# Patient Record
Sex: FEMALE | Race: WHITE | NOT HISPANIC OR LATINO | Employment: UNEMPLOYED
[De-identification: names, ages, dates, MRNs, and addresses within clinical notes are randomized per-mention and may not be internally consistent; named-entity substitution may affect disease eponyms.]

---

## 2017-07-22 ENCOUNTER — HEALTH MAINTENANCE LETTER (OUTPATIENT)
Age: 32
End: 2017-07-22

## 2019-09-17 ENCOUNTER — OFFICE VISIT (OUTPATIENT)
Dept: OBGYN | Facility: CLINIC | Age: 34
End: 2019-09-17
Payer: MEDICAID

## 2019-09-17 ENCOUNTER — ANCILLARY PROCEDURE (OUTPATIENT)
Dept: ULTRASOUND IMAGING | Facility: CLINIC | Age: 34
End: 2019-09-17
Attending: OBSTETRICS & GYNECOLOGY
Payer: MEDICAID

## 2019-09-17 ENCOUNTER — TELEPHONE (OUTPATIENT)
Dept: OBGYN | Facility: CLINIC | Age: 34
End: 2019-09-17

## 2019-09-17 VITALS — SYSTOLIC BLOOD PRESSURE: 136 MMHG | DIASTOLIC BLOOD PRESSURE: 88 MMHG

## 2019-09-17 DIAGNOSIS — O20.0 THREATENED MISCARRIAGE: ICD-10-CM

## 2019-09-17 DIAGNOSIS — O20.0 THREATENED MISCARRIAGE: Primary | ICD-10-CM

## 2019-09-17 DIAGNOSIS — O20.0 MISCARRIAGE, THREATENED, EARLY PREGNANCY: Primary | ICD-10-CM

## 2019-09-17 LAB — B-HCG SERPL-ACNC: <1 IU/L (ref 0–5)

## 2019-09-17 PROCEDURE — 99202 OFFICE O/P NEW SF 15 MIN: CPT | Performed by: OBSTETRICS & GYNECOLOGY

## 2019-09-17 PROCEDURE — 76817 TRANSVAGINAL US OBSTETRIC: CPT | Performed by: OBSTETRICS & GYNECOLOGY

## 2019-09-17 PROCEDURE — 84702 CHORIONIC GONADOTROPIN TEST: CPT | Performed by: OBSTETRICS & GYNECOLOGY

## 2019-09-17 PROCEDURE — 36415 COLL VENOUS BLD VENIPUNCTURE: CPT | Performed by: OBSTETRICS & GYNECOLOGY

## 2019-09-17 PROCEDURE — 76801 OB US < 14 WKS SINGLE FETUS: CPT | Performed by: OBSTETRICS & GYNECOLOGY

## 2019-09-17 RX ORDER — OMEPRAZOLE 20 MG/1
20 TABLET, DELAYED RELEASE ORAL DAILY
COMMUNITY

## 2019-09-17 RX ORDER — PRENATAL VIT/IRON FUM/FOLIC AC 27MG-0.8MG
1 TABLET ORAL DAILY
COMMUNITY
End: 2021-06-14

## 2019-09-17 RX ORDER — IBUPROFEN 200 MG
200 TABLET ORAL EVERY 4 HOURS PRN
COMMUNITY
End: 2021-04-09

## 2019-09-17 NOTE — PROGRESS NOTES
S: here for ultrasound follow up as an add on after calling with early pregnancy spotting. She has not been seen in our [facility] for quite some time, but was a patient here years ago.    Reports regular cycles, was tracking periods and states this was a planned pregnancy. Took 2 urine pregnancy tests at home that were positive. Light spotting started late yesterday, no heavy bleeding, no pain.    Past Medical History:   Diagnosis Date     Bipolar 2 disorder (H)      Mixed hyperlipidemia      Past Surgical History:   Procedure Laterality Date     C NONSPECIFIC PROCEDURE  3/21/01    Primary Low Transverse  Section. abstracted 02.     HC LAPAROSCOPY, SURGICAL; CHOLECYSTECTOMY       HC REPAIR HERNIA WITH MESH       Social History     Socioeconomic History     Marital status: Single     Spouse name: Not on file     Number of children: Not on file     Years of education: Not on file     Highest education level: Not on file   Occupational History     Not on file   Social Needs     Financial resource strain: Not on file     Food insecurity:     Worry: Not on file     Inability: Not on file     Transportation needs:     Medical: Not on file     Non-medical: Not on file   Tobacco Use     Smoking status: Current Every Day Smoker     Packs/day: 1.00     Years: 5.00     Pack years: 5.00     Smokeless tobacco: Never Used     Tobacco comment: more when working   Substance and Sexual Activity     Alcohol use: Yes     Comment: Social     Drug use: No     Sexual activity: Yes     Partners: Male     Birth control/protection: Condom, Pill   Lifestyle     Physical activity:     Days per week: Not on file     Minutes per session: Not on file     Stress: Not on file   Relationships     Social connections:     Talks on phone: Not on file     Gets together: Not on file     Attends [employer] service: Not on file     Active member of club or organization: Not on file     Attends meetings of clubs or organizations: Not on file      Relationship status: Not on file     Intimate partner violence:     Fear of current or ex partner: Not on file     Emotionally abused: Not on file     Physically abused: Not on file     Forced sexual activity: Not on file   Other Topics Concern     Parent/sibling w/ CABG, MI or angioplasty before 65F 55M? Not Asked   Social History Narrative     Not on file         O: /88 (BP Location: Right arm, Cuff Size: Adult Regular)   LMP 07/07/2019 (Exact Date)   General: appears well, no distress.  US shows no intrauterine pregnancy or any evidence of ectopic. Endometrial stripe is thin.    A/P: spotting, with either early pregnancy or possible chemical pregnancy now resolved, less likely ectopic.    -Will get quantitative HCG today, if positive, repeat on Thursday.  -Blood type confirmed O positive from prior records in Epic.  -discussed possibility of pregnancy at less than 5 weeks, ectopic (precautions given), early loss. Follow up plan after labs are done.    She states her understanding.    Trina Fried MD

## 2019-09-17 NOTE — TELEPHONE ENCOUNTER
Pt calling.   LMP 7/7/19. Normal period. approx 10+ weeks pregnant.    Pt started having bright red spotting yesterday, which has continued on today.     Some cramping noted. Minimal.     Pt is O positive.     Pt does have her NPN appts scheduled.     Pt will come in for an  today. Orders placed.

## 2019-09-17 NOTE — NURSING NOTE
"Chief Complaint   Patient presents with     Prenatal Care     LMP 2019, 2 positive home HCG tests a month ago in August. Bleeding started yesterday.       Initial /88 (BP Location: Right arm, Cuff Size: Adult Regular)   LMP 2019 (Exact Date)  Estimated body mass index is 42.51 kg/m  as calculated from the following:    Height as of 13: 1.6 m (5' 3\").    Weight as of 13: 108.9 kg (240 lb).  BP completed using cuff size: regular    Questioned patient about current smoking habits.  Pt. currently smokes.  Advised about smoking cessation.          Narayan Virk, OLI               "

## 2019-11-04 ENCOUNTER — HEALTH MAINTENANCE LETTER (OUTPATIENT)
Age: 34
End: 2019-11-04

## 2020-11-16 ENCOUNTER — HEALTH MAINTENANCE LETTER (OUTPATIENT)
Age: 35
End: 2020-11-16

## 2020-12-08 ENCOUNTER — HOSPITAL ENCOUNTER (EMERGENCY)
Facility: CLINIC | Age: 35
Discharge: HOME | End: 2020-12-08
Attending: EMERGENCY MEDICINE | Admitting: EMERGENCY MEDICINE
Payer: COMMERCIAL

## 2020-12-08 VITALS
RESPIRATION RATE: 16 BRPM | DIASTOLIC BLOOD PRESSURE: 111 MMHG | SYSTOLIC BLOOD PRESSURE: 149 MMHG | HEART RATE: 75 BPM | TEMPERATURE: 97.3 F | OXYGEN SATURATION: 100 %

## 2020-12-08 DIAGNOSIS — Z20.822 EXPOSURE TO COVID-19 VIRUS: ICD-10-CM

## 2020-12-08 LAB
SARS-COV-2 RNA SPEC QL NAA+PROBE: NORMAL
SPECIMEN SOURCE: NORMAL

## 2020-12-08 PROCEDURE — U0003 INFECTIOUS AGENT DETECTION BY NUCLEIC ACID (DNA OR RNA); SEVERE ACUTE RESPIRATORY SYNDROME CORONAVIRUS 2 (SARS-COV-2) (CORONAVIRUS DISEASE [COVID-19]), AMPLIFIED PROBE TECHNIQUE, MAKING USE OF HIGH THROUGHPUT TECHNOLOGIES AS DESCRIBED BY CMS-2020-01-R: HCPCS | Performed by: EMERGENCY MEDICINE

## 2020-12-08 PROCEDURE — 99283 EMERGENCY DEPT VISIT LOW MDM: CPT

## 2020-12-08 PROCEDURE — C9803 HOPD COVID-19 SPEC COLLECT: HCPCS

## 2020-12-08 ASSESSMENT — ENCOUNTER SYMPTOMS
MYALGIAS: 0
FEVER: 0
FATIGUE: 0
DIARRHEA: 0
SHORTNESS OF BREATH: 0

## 2020-12-08 NOTE — ED PROVIDER NOTES
History     Chief Complaint:  Covid Testing     HPI  Disha Watson is a 35 year old female presents for evaluation of Covid testing.  Patient is a mother of current ED patient being admitted for behavioral health who tested positive for COVID-19 .  Patient otherwise denies any symptoms including fever, body aches, fatigue, chest pain, shortness of breath, loss of taste/smell, diarrhea.  Given close contact with daughter, patient would like to be tested.      Allergies:  No Known Drug Allergies     Medications:    Prilosec    Medical History:   Bipolar 2 disorder   Hyperlipidemia      Surgical History   Cholecystectomy   Hernia with mesh repair    section     Family History:   Mother: Psychotic disorder     Social History:  Patient was not accompanied to the ED.   Smoking Status:  Smoker    Type: Cigarettes   Packs/Day: 1.00  Smokeless Tobacco: Never Used   Alcohol Use: Positive   Drug Use: Negative    [place]       Review of Systems   Constitutional: Negative for fatigue and fever.   Respiratory: Negative for shortness of breath.    Cardiovascular: Negative for chest pain.   Gastrointestinal: Negative for diarrhea.   Musculoskeletal: Negative for myalgias.   Neurological:        No loss of taste/smell   All other systems reviewed and are negative.         Physical Exam     Patient Vitals for the past 24 hrs:   BP Temp Temp src Pulse Resp SpO2   20 1614 (!) 149/111 97.3  F (36.3  C) Oral 75 16 100 %          Physical Exam  General: Alert, no acute distress; speaking in full sentences  HEENT:  Moist mucous membranes.   Conjunctiva normal.  No cervical lymphadenopathy  CV:  RRR, no m/r/g, skin warm and well perfused  Pulm:  CTAB, no wheezes/ronchi/rales.  No acute distress, breathing comfortably  MSK:  Moving all extremities.  No focal areas of edema, erythema, or tenderness  Skin:  WWP, no rashes, no lower extremity edema, skin color normal, no diaphoresis    Emergency Department Course      Laboratory:    Asymptomatic COVID-19 Virus (Coronavirus), PCR NP Swab: pending          Emergency Department Course:  1605 Nursing notes and vitals reviewed.   I performed an exam of the patient as documented above.     1609 Findings and plan explained to the Patient. Patient discharged home with instructions regarding supportive care, medications, and reasons to return. The importance of close follow-up was reviewed.     I personally reviewed the lab orderswith the Patient and answered all related questions prior to discharge.    Impression & Plan     Medical Decision Making:  Disha Watson is a 35 year old female who presents to the ER for Covid testing.  Patient denies any symptoms but daughter who is being admitted to  tested positive for Covid 12/7.  Vital signs are stable and exam is unremarkable.  COVID swab sent and pending.  Patient is otherwise safe to discharge home with results pending.  Reasons to return to the ER discussed.  All questions answered prior to discharge.    Covid-19  Disha Watson was evaluated during a global COVID-19 pandemic, which necessitated consideration that the patient might be at risk for infection with the SARS-CoV-2 virus that causes COVID-19.   Applicable protocols for evaluation were followed during the patient's care.   COVID-19 was considered as part of the patient's evaluation. The plan for testing is:  a test was obtained during this visit.       Diagnosis:     ICD-10-CM    1. Exposure to COVID-19 virus  Z20.828 Asymptomatic COVID-19 Virus (Coronavirus) by PCR        Disposition:  Discharged to home.    Scribe Disclosure:  I, Ron Grossman, am serving as a scribe at 4:21 PM on 12/8/2020 to document services personally performed by Dimitry Kohli MD based on my observations and the provider's statements to me.     This note was completed in part using Dragon voice recognition software. Although reviewed after completion, some word and grammatical errors may  occur.     [facility]  December 8, 2020      [place], Dimitry Lopez MD  12/08/20 1585

## 2020-12-08 NOTE — ED TRIAGE NOTES
Pt here as visitor of daughter who is waiting for inpatient bed.  Daughter tested positive for COVID.  Pt is requesting testing.

## 2020-12-08 NOTE — ED AVS SNAPSHOT
[facility]  [address]  [facility] [address]  Phone: [phone]  Fax: [phone]                                    Disha Watson   MRN: [id number]    Department: [facility]   Date of Visit: 12/8/2020           After Visit Summary Signature Page    I have received my discharge instructions, and my questions have been answered. I have discussed any challenges I see with this plan with the nurse or doctor.    ..........................................................................................................................................  Patient/Patient Representative Signature      ..........................................................................................................................................  Patient Representative Print Name and Relationship to Patient    ..................................................               ................................................  Date                                   Time    ..........................................................................................................................................  Reviewed by Signature/Title    ...................................................              ..............................................  Date                                               Time          22EPIC Rev 08/18

## 2020-12-09 LAB
LABORATORY COMMENT REPORT: NORMAL
SARS-COV-2 RNA SPEC QL NAA+PROBE: NEGATIVE
SPECIMEN SOURCE: NORMAL

## 2021-04-08 VITALS
TEMPERATURE: 97.9 F | OXYGEN SATURATION: 98 % | SYSTOLIC BLOOD PRESSURE: 163 MMHG | HEART RATE: 88 BPM | RESPIRATION RATE: 20 BRPM | DIASTOLIC BLOOD PRESSURE: 100 MMHG

## 2021-04-08 PROCEDURE — 99284 EMERGENCY DEPT VISIT MOD MDM: CPT | Mod: 25

## 2021-04-08 PROCEDURE — 27786 TREATMENT OF ANKLE FRACTURE: CPT | Mod: LT

## 2021-04-09 ENCOUNTER — HOSPITAL ENCOUNTER (EMERGENCY)
Facility: CLINIC | Age: 36
Discharge: HOME | End: 2021-04-09
Attending: EMERGENCY MEDICINE | Admitting: EMERGENCY MEDICINE
Payer: COMMERCIAL

## 2021-04-09 ENCOUNTER — APPOINTMENT (OUTPATIENT)
Dept: GENERAL RADIOLOGY | Facility: CLINIC | Age: 36
End: 2021-04-09
Attending: EMERGENCY MEDICINE
Payer: COMMERCIAL

## 2021-04-09 DIAGNOSIS — S82.832A CLOSED FRACTURE OF DISTAL END OF LEFT FIBULA, UNSPECIFIED FRACTURE MORPHOLOGY, INITIAL ENCOUNTER: ICD-10-CM

## 2021-04-09 PROCEDURE — 73610 X-RAY EXAM OF ANKLE: CPT | Mod: LT

## 2021-04-09 RX ORDER — HYDROCODONE BITARTRATE AND ACETAMINOPHEN 5; 325 MG/1; MG/1
1 TABLET ORAL EVERY 4 HOURS PRN
Qty: 12 TABLET | Refills: 0 | Status: SHIPPED | OUTPATIENT
Start: 2021-04-09 | End: 2021-04-12

## 2021-04-09 ASSESSMENT — ENCOUNTER SYMPTOMS: ARTHRALGIAS: 1

## 2021-04-09 NOTE — ED PROVIDER NOTES
History     Chief Complaint:  Left ankle Pain     HPI   Disha Watson is a 35 year old female with history of fibromyalgia and metabolic syndrome who presents withankle pain. The patient states that she was walking out of her house in her slippers this evening when she slipped and landed hard on her left leg on the corner of the stair. She says she heard several cracking noises in her leg with the incident, and her pain goes up to her mid shin. She notes she did have a few drinks with vodka this evening but stopped drinking once the incident occurred.     Review of Systems   Musculoskeletal: Positive for arthralgias (left ankle).   All other systems reviewed and are negative.    Allergies:  Morphine [Fumaric Acid]  Prednisone  Duloxetine  Pregabalin  Tramadol    Medications:  The patient does not take any regular medications.    Past Medical History:    Bipolar 2 disorder  Hyperlipidemia  Fibromyalgia  Iron deficiency  Gastroesophageal reflux disease  Panic attacks  Migraine  Metabolic syndrome    Past Surgical History:    Bariatric surgery, sleeve gastroplasty  Laparoscopic cholecystectomy   section  Repair hernia with mesh  Cholecystectomy  Cleft lip repair     Family History:    Psychotic disorder  Coronary artery disease  Alcohol/Drug  Hypertension  Hyperlipidemia  Pulmonary fibrosis    Social History:  Accompanied by daughter.  Works at [employer].    Physical Exam     Patient Vitals for the past 24 hrs:   BP Temp Temp src Pulse Resp SpO2   21 2357 163/100 97.9  F (36.6  C) Temporal 88 20 98 %     Physical Exam  Nursing note and vitals reviewed.  Constitutional: Cooperative.   Cardiovascular: Normal rate, regular rhythm. 2+ left DP pulse  Pulmonary/Chest: Effort normal.   Abdominal: Normal appearance. There is no tenderness.   Musculoskeletal: LLE:  Swelling and tenderness to the lateral malleolus. No tenderness to the proximal fibula. Full ROM of hip and knee.  Neurological:  Alert. GCS 15. Strength and sensation in LLE normal.   Skin: Skin is warm and dry.  Psychiatric: Normal mood and affect.     Emergency Department Course     Imaging:  XR Ankle Left G/E 3 Views  Mildly displaced oblique fracture through the distal fibula ending at the level the ankle mortise with overlying soft tissue swelling. Mild widening of the distal tibiofibular syndesmosis.  Reading per radiology    Procedures       Left Leg Splint Placement   Well-padded fiberglass posterior slab splint with stirrup was applied to the left leg and after placement I checked and adjusted the fit to ensure proper positioning. Patient was more comfortable with splint in place. Sensation and circulation are intact after splint placement.    Reviewed:  I reviewed nursing notes, vitals, past medical history and care everywhere    Assessments:  0022 I obtained history and examined the patient as noted above. I also performed the splint procedure as noted above.    Disposition:  The patient was discharged to home.     Impression & Plan     Medical Decision Making:  Disha Watson is a 35 year old female who presents with a mildly displaced fibula fracture, confirmed on x-ray.  There is no evidence as above of related neurovascular compromise nor of compartment syndrome. There is no proximal fibular tenderness or knee pain to suggest maisonneuve fracture. As above, a splint was placed, with good pain relief.  The patient was provided crutches and oral pain medications, with the plan to return for increasing pain, swelling, numbness, or any other concerning symptoms. Finally, I recommended she follow up with orthopedics within 3-5 days.    Diagnosis:    ICD-10-CM    1. Closed fracture of distal end of left fibula  S82.832A        Discharge Medications:  New Prescriptions    HYDROCODONE-ACETAMINOPHEN (NORCO) 5-325 MG TABLET    Take 1 tablet by mouth every 4 hours as needed for pain       Scribe Disclosure:  Vee PEREZ am  serving as a scribe at 12:21 AM on 4/9/2021 to document services personally performed by Dimitry Bautista MD based on my observations and the provider's statements to me.        Dimitry Bautista MD  04/09/21 0127

## 2021-04-09 NOTE — LETTER
Disha Watson was seen and treated in our emergency department, Pt needs to be excused from work through the weekend.      DEANDRA Robertson RN

## 2021-06-14 ENCOUNTER — NURSE TRIAGE (OUTPATIENT)
Dept: NURSING | Facility: CLINIC | Age: 36
End: 2021-06-14

## 2021-06-14 ENCOUNTER — HOSPITAL ENCOUNTER (INPATIENT)
Facility: CLINIC | Age: 36
LOS: 1 days | Discharge: HOME | End: 2021-06-16
Attending: EMERGENCY MEDICINE | Admitting: HOSPITALIST
Payer: COMMERCIAL

## 2021-06-14 DIAGNOSIS — R11.0 NAUSEA: ICD-10-CM

## 2021-06-14 DIAGNOSIS — K59.00 CONSTIPATION, UNSPECIFIED CONSTIPATION TYPE: ICD-10-CM

## 2021-06-14 DIAGNOSIS — N10 PYELONEPHRITIS, ACUTE: ICD-10-CM

## 2021-06-14 DIAGNOSIS — I10 BENIGN ESSENTIAL HYPERTENSION: Primary | ICD-10-CM

## 2021-06-14 DIAGNOSIS — A41.9 SEPSIS WITHOUT ACUTE ORGAN DYSFUNCTION, DUE TO UNSPECIFIED ORGANISM (H): ICD-10-CM

## 2021-06-14 LAB
ALBUMIN SERPL-MCNC: 2.9 G/DL (ref 3.4–5)
ALBUMIN UR-MCNC: 50 MG/DL
ALP SERPL-CCNC: 138 U/L (ref 40–150)
ALT SERPL W P-5'-P-CCNC: 56 U/L (ref 0–50)
ANION GAP SERPL CALCULATED.3IONS-SCNC: 4 MMOL/L (ref 3–14)
APPEARANCE UR: ABNORMAL
AST SERPL W P-5'-P-CCNC: 78 U/L (ref 0–45)
BACTERIA #/AREA URNS HPF: ABNORMAL /HPF
BASOPHILS # BLD AUTO: 0 10E9/L (ref 0–0.2)
BASOPHILS NFR BLD AUTO: 0.7 %
BILIRUB DIRECT SERPL-MCNC: 0.7 MG/DL (ref 0–0.2)
BILIRUB SERPL-MCNC: 1.2 MG/DL (ref 0.2–1.3)
BILIRUB UR QL STRIP: NEGATIVE
BUN SERPL-MCNC: 5 MG/DL (ref 7–30)
CALCIUM SERPL-MCNC: 8.3 MG/DL (ref 8.5–10.1)
CHLORIDE SERPL-SCNC: 102 MMOL/L (ref 94–109)
CO2 SERPL-SCNC: 29 MMOL/L (ref 20–32)
COLOR UR AUTO: ABNORMAL
CREAT SERPL-MCNC: 0.58 MG/DL (ref 0.52–1.04)
DIFFERENTIAL METHOD BLD: ABNORMAL
EOSINOPHIL # BLD AUTO: 0 10E9/L (ref 0–0.7)
EOSINOPHIL NFR BLD AUTO: 0 %
ERYTHROCYTE [DISTWIDTH] IN BLOOD BY AUTOMATED COUNT: 15.4 % (ref 10–15)
GFR SERPL CREATININE-BSD FRML MDRD: >90 ML/MIN/{1.73_M2}
GLUCOSE SERPL-MCNC: 135 MG/DL (ref 70–99)
GLUCOSE UR STRIP-MCNC: NEGATIVE MG/DL
HCG SERPL QL: NEGATIVE
HCT VFR BLD AUTO: 37.6 % (ref 35–47)
HGB BLD-MCNC: 12.8 G/DL (ref 11.7–15.7)
HGB UR QL STRIP: ABNORMAL
IMM GRANULOCYTES # BLD: 0.1 10E9/L (ref 0–0.4)
IMM GRANULOCYTES NFR BLD: 1.1 %
KETONES UR STRIP-MCNC: NEGATIVE MG/DL
LABORATORY COMMENT REPORT: NORMAL
LACTATE BLD-SCNC: 1.5 MMOL/L (ref 0.7–2)
LEUKOCYTE ESTERASE UR QL STRIP: ABNORMAL
LYMPHOCYTES # BLD AUTO: 0.5 10E9/L (ref 0.8–5.3)
LYMPHOCYTES NFR BLD AUTO: 8.5 %
MCH RBC QN AUTO: 34.8 PG (ref 26.5–33)
MCHC RBC AUTO-ENTMCNC: 34 G/DL (ref 31.5–36.5)
MCV RBC AUTO: 102 FL (ref 78–100)
MONOCYTES # BLD AUTO: 0.1 10E9/L (ref 0–1.3)
MONOCYTES NFR BLD AUTO: 2.6 %
MUCOUS THREADS #/AREA URNS LPF: PRESENT /LPF
NEUTROPHILS # BLD AUTO: 4.7 10E9/L (ref 1.6–8.3)
NEUTROPHILS NFR BLD AUTO: 87.1 %
NITRATE UR QL: NEGATIVE
NRBC # BLD AUTO: 0 10*3/UL
NRBC BLD AUTO-RTO: 0 /100
PH UR STRIP: 6 PH (ref 5–7)
PLATELET # BLD AUTO: 126 10E9/L (ref 150–450)
POTASSIUM SERPL-SCNC: 3 MMOL/L (ref 3.4–5.3)
PROT SERPL-MCNC: 7.5 G/DL (ref 6.8–8.8)
RBC # BLD AUTO: 3.68 10E12/L (ref 3.8–5.2)
RBC #/AREA URNS AUTO: 18 /HPF (ref 0–2)
SARS-COV-2 RNA RESP QL NAA+PROBE: NEGATIVE
SODIUM SERPL-SCNC: 135 MMOL/L (ref 133–144)
SOURCE: ABNORMAL
SP GR UR STRIP: 1.02 (ref 1–1.03)
SPECIMEN SOURCE: NORMAL
SQUAMOUS #/AREA URNS AUTO: 3 /HPF (ref 0–1)
UROBILINOGEN UR STRIP-MCNC: 4 MG/DL (ref 0–2)
WBC # BLD AUTO: 5.4 10E9/L (ref 4–11)
WBC #/AREA URNS AUTO: >182 /HPF (ref 0–5)

## 2021-06-14 PROCEDURE — 250N000013 HC RX MED GY IP 250 OP 250 PS 637: Performed by: EMERGENCY MEDICINE

## 2021-06-14 PROCEDURE — 99220 PR INITIAL OBSERVATION CARE,LEVEL III: CPT | Performed by: HOSPITALIST

## 2021-06-14 PROCEDURE — 80076 HEPATIC FUNCTION PANEL: CPT | Performed by: EMERGENCY MEDICINE

## 2021-06-14 PROCEDURE — 81001 URINALYSIS AUTO W/SCOPE: CPT | Performed by: EMERGENCY MEDICINE

## 2021-06-14 PROCEDURE — 250N000011 HC RX IP 250 OP 636: Performed by: EMERGENCY MEDICINE

## 2021-06-14 PROCEDURE — 87086 URINE CULTURE/COLONY COUNT: CPT | Performed by: EMERGENCY MEDICINE

## 2021-06-14 PROCEDURE — 87186 SC STD MICRODIL/AGAR DIL: CPT | Performed by: EMERGENCY MEDICINE

## 2021-06-14 PROCEDURE — 36415 COLL VENOUS BLD VENIPUNCTURE: CPT | Performed by: EMERGENCY MEDICINE

## 2021-06-14 PROCEDURE — 250N000011 HC RX IP 250 OP 636: Performed by: HOSPITALIST

## 2021-06-14 PROCEDURE — 258N000003 HC RX IP 258 OP 636: Performed by: EMERGENCY MEDICINE

## 2021-06-14 PROCEDURE — 250N000013 HC RX MED GY IP 250 OP 250 PS 637: Performed by: PHYSICIAN ASSISTANT

## 2021-06-14 PROCEDURE — 96376 TX/PRO/DX INJ SAME DRUG ADON: CPT

## 2021-06-14 PROCEDURE — 96361 HYDRATE IV INFUSION ADD-ON: CPT

## 2021-06-14 PROCEDURE — 83605 ASSAY OF LACTIC ACID: CPT | Performed by: EMERGENCY MEDICINE

## 2021-06-14 PROCEDURE — 80048 BASIC METABOLIC PNL TOTAL CA: CPT | Performed by: EMERGENCY MEDICINE

## 2021-06-14 PROCEDURE — 87077 CULTURE AEROBIC IDENTIFY: CPT | Performed by: EMERGENCY MEDICINE

## 2021-06-14 PROCEDURE — 250N000013 HC RX MED GY IP 250 OP 250 PS 637: Performed by: HOSPITALIST

## 2021-06-14 PROCEDURE — 96375 TX/PRO/DX INJ NEW DRUG ADDON: CPT

## 2021-06-14 PROCEDURE — 87800 DETECT AGNT MULT DNA DIREC: CPT | Performed by: EMERGENCY MEDICINE

## 2021-06-14 PROCEDURE — G0378 HOSPITAL OBSERVATION PER HR: HCPCS

## 2021-06-14 PROCEDURE — 99285 EMERGENCY DEPT VISIT HI MDM: CPT | Mod: 25

## 2021-06-14 PROCEDURE — C9803 HOPD COVID-19 SPEC COLLECT: HCPCS

## 2021-06-14 PROCEDURE — 84703 CHORIONIC GONADOTROPIN ASSAY: CPT | Performed by: EMERGENCY MEDICINE

## 2021-06-14 PROCEDURE — 96365 THER/PROPH/DIAG IV INF INIT: CPT

## 2021-06-14 PROCEDURE — 87088 URINE BACTERIA CULTURE: CPT | Performed by: EMERGENCY MEDICINE

## 2021-06-14 PROCEDURE — 85025 COMPLETE CBC W/AUTO DIFF WBC: CPT | Performed by: EMERGENCY MEDICINE

## 2021-06-14 PROCEDURE — 87040 BLOOD CULTURE FOR BACTERIA: CPT | Performed by: EMERGENCY MEDICINE

## 2021-06-14 PROCEDURE — 87635 SARS-COV-2 COVID-19 AMP PRB: CPT | Performed by: EMERGENCY MEDICINE

## 2021-06-14 RX ORDER — OXYCODONE HYDROCHLORIDE 5 MG/1
5 TABLET ORAL EVERY 4 HOURS PRN
Status: DISCONTINUED | OUTPATIENT
Start: 2021-06-14 | End: 2021-06-14

## 2021-06-14 RX ORDER — HYDROMORPHONE HYDROCHLORIDE 1 MG/ML
0.5 INJECTION, SOLUTION INTRAMUSCULAR; INTRAVENOUS; SUBCUTANEOUS
Status: DISCONTINUED | OUTPATIENT
Start: 2021-06-14 | End: 2021-06-14

## 2021-06-14 RX ORDER — ACETAMINOPHEN 325 MG/1
975 TABLET ORAL ONCE
Status: COMPLETED | OUTPATIENT
Start: 2021-06-14 | End: 2021-06-14

## 2021-06-14 RX ORDER — OXYCODONE HYDROCHLORIDE 5 MG/1
5-10 TABLET ORAL EVERY 4 HOURS PRN
Status: DISCONTINUED | OUTPATIENT
Start: 2021-06-14 | End: 2021-06-15

## 2021-06-14 RX ORDER — IBUPROFEN 200 MG
800 TABLET ORAL EVERY 8 HOURS PRN
COMMUNITY

## 2021-06-14 RX ORDER — ACETAMINOPHEN 650 MG/1
650 SUPPOSITORY RECTAL EVERY 4 HOURS PRN
Status: DISCONTINUED | OUTPATIENT
Start: 2021-06-14 | End: 2021-06-16 | Stop reason: HOSPADM

## 2021-06-14 RX ORDER — NALOXONE HYDROCHLORIDE 0.4 MG/ML
0.4 INJECTION, SOLUTION INTRAMUSCULAR; INTRAVENOUS; SUBCUTANEOUS
Status: DISCONTINUED | OUTPATIENT
Start: 2021-06-14 | End: 2021-06-16 | Stop reason: HOSPADM

## 2021-06-14 RX ORDER — HYDROMORPHONE HYDROCHLORIDE 1 MG/ML
0.3 INJECTION, SOLUTION INTRAMUSCULAR; INTRAVENOUS; SUBCUTANEOUS
Status: DISCONTINUED | OUTPATIENT
Start: 2021-06-14 | End: 2021-06-16 | Stop reason: HOSPADM

## 2021-06-14 RX ORDER — NALOXONE HYDROCHLORIDE 0.4 MG/ML
0.2 INJECTION, SOLUTION INTRAMUSCULAR; INTRAVENOUS; SUBCUTANEOUS
Status: DISCONTINUED | OUTPATIENT
Start: 2021-06-14 | End: 2021-06-16 | Stop reason: HOSPADM

## 2021-06-14 RX ORDER — IBUPROFEN 600 MG/1
600 TABLET, FILM COATED ORAL EVERY 6 HOURS PRN
Status: DISCONTINUED | OUTPATIENT
Start: 2021-06-14 | End: 2021-06-16 | Stop reason: HOSPADM

## 2021-06-14 RX ORDER — SODIUM CHLORIDE AND POTASSIUM CHLORIDE 150; 900 MG/100ML; MG/100ML
INJECTION, SOLUTION INTRAVENOUS CONTINUOUS
Status: DISCONTINUED | OUTPATIENT
Start: 2021-06-14 | End: 2021-06-15

## 2021-06-14 RX ORDER — CEFTRIAXONE 2 G/1
2 INJECTION, POWDER, FOR SOLUTION INTRAMUSCULAR; INTRAVENOUS ONCE
Status: COMPLETED | OUTPATIENT
Start: 2021-06-14 | End: 2021-06-14

## 2021-06-14 RX ORDER — CEFTRIAXONE 1 G/1
1 INJECTION, POWDER, FOR SOLUTION INTRAMUSCULAR; INTRAVENOUS EVERY 24 HOURS
Status: DISCONTINUED | OUTPATIENT
Start: 2021-06-15 | End: 2021-06-15

## 2021-06-14 RX ORDER — ONDANSETRON 4 MG/1
4 TABLET, ORALLY DISINTEGRATING ORAL EVERY 6 HOURS PRN
Status: DISCONTINUED | OUTPATIENT
Start: 2021-06-14 | End: 2021-06-16 | Stop reason: HOSPADM

## 2021-06-14 RX ORDER — ONDANSETRON 2 MG/ML
4 INJECTION INTRAMUSCULAR; INTRAVENOUS EVERY 30 MIN PRN
Status: DISCONTINUED | OUTPATIENT
Start: 2021-06-14 | End: 2021-06-14

## 2021-06-14 RX ORDER — ONDANSETRON 2 MG/ML
4 INJECTION INTRAMUSCULAR; INTRAVENOUS EVERY 6 HOURS PRN
Status: DISCONTINUED | OUTPATIENT
Start: 2021-06-14 | End: 2021-06-16 | Stop reason: HOSPADM

## 2021-06-14 RX ORDER — POTASSIUM CHLORIDE 1500 MG/1
40 TABLET, EXTENDED RELEASE ORAL ONCE
Status: COMPLETED | OUTPATIENT
Start: 2021-06-14 | End: 2021-06-14

## 2021-06-14 RX ORDER — ACETAMINOPHEN 325 MG/1
650 TABLET ORAL EVERY 4 HOURS PRN
Status: DISCONTINUED | OUTPATIENT
Start: 2021-06-14 | End: 2021-06-16 | Stop reason: HOSPADM

## 2021-06-14 RX ADMIN — HYDROMORPHONE HYDROCHLORIDE 0.5 MG: 1 INJECTION, SOLUTION INTRAMUSCULAR; INTRAVENOUS; SUBCUTANEOUS at 08:57

## 2021-06-14 RX ADMIN — POTASSIUM CHLORIDE AND SODIUM CHLORIDE: 900; 150 INJECTION, SOLUTION INTRAVENOUS at 22:25

## 2021-06-14 RX ADMIN — ACETAMINOPHEN 975 MG: 325 TABLET, FILM COATED ORAL at 07:46

## 2021-06-14 RX ADMIN — OXYCODONE HYDROCHLORIDE 10 MG: 5 TABLET ORAL at 16:47

## 2021-06-14 RX ADMIN — CEFTRIAXONE 2 G: 2 INJECTION, POWDER, FOR SOLUTION INTRAMUSCULAR; INTRAVENOUS at 09:01

## 2021-06-14 RX ADMIN — OXYCODONE HYDROCHLORIDE 5 MG: 5 TABLET ORAL at 12:37

## 2021-06-14 RX ADMIN — ACETAMINOPHEN 650 MG: 325 TABLET, FILM COATED ORAL at 15:27

## 2021-06-14 RX ADMIN — ACETAMINOPHEN 650 MG: 325 TABLET, FILM COATED ORAL at 20:39

## 2021-06-14 RX ADMIN — POTASSIUM CHLORIDE 40 MEQ: 1500 TABLET, EXTENDED RELEASE ORAL at 09:12

## 2021-06-14 RX ADMIN — OMEPRAZOLE 20 MG: 20 CAPSULE, DELAYED RELEASE ORAL at 12:02

## 2021-06-14 RX ADMIN — HYDROMORPHONE HYDROCHLORIDE 0.3 MG: 1 INJECTION, SOLUTION INTRAMUSCULAR; INTRAVENOUS; SUBCUTANEOUS at 13:59

## 2021-06-14 RX ADMIN — ONDANSETRON HYDROCHLORIDE 4 MG: 2 INJECTION, SOLUTION INTRAMUSCULAR; INTRAVENOUS at 21:44

## 2021-06-14 RX ADMIN — SODIUM CHLORIDE, POTASSIUM CHLORIDE, SODIUM LACTATE AND CALCIUM CHLORIDE 1000 ML: 600; 310; 30; 20 INJECTION, SOLUTION INTRAVENOUS at 09:40

## 2021-06-14 RX ADMIN — ONDANSETRON 4 MG: 2 INJECTION INTRAMUSCULAR; INTRAVENOUS at 08:56

## 2021-06-14 RX ADMIN — OXYCODONE HYDROCHLORIDE 10 MG: 5 TABLET ORAL at 20:39

## 2021-06-14 RX ADMIN — POTASSIUM CHLORIDE AND SODIUM CHLORIDE: 900; 150 INJECTION, SOLUTION INTRAVENOUS at 12:36

## 2021-06-14 ASSESSMENT — ENCOUNTER SYMPTOMS
DIARRHEA: 0
FEVER: 1
CHILLS: 1
SHORTNESS OF BREATH: 0
CONSTIPATION: 1
VOMITING: 1
ABDOMINAL PAIN: 1
FLANK PAIN: 1

## 2021-06-14 ASSESSMENT — MIFFLIN-ST. JEOR
SCORE: 1729.13
SCORE: 1750.46

## 2021-06-14 NOTE — TELEPHONE ENCOUNTER
Disha had a bladder infection a couple of weeks ago.  Today has cloudy urine, fowl smelling and low back pain and headache and nauseated.  Temp is currently 103.7.  Disha states that she fainted over the weekend.  Disha states that she will go to ED.  Patient denies burning with urination    COVID 19 Nurse Triage Plan/Patient Instructions    Please be aware that novel coronavirus (COVID-19) may be circulating in the community. If you develop symptoms such as fever, cough, or SOB or if you have concerns about the presence of another infection including coronavirus (COVID-19), please contact your health care provider or visit [email or web address].     Disposition/Instructions    In-Person Visit with provider recommended. Reference Visit Selection Guide.    Thank you for taking steps to prevent the spread of this virus.  o Limit your contact with others.  o Wear a simple mask to cover your cough.  o Wash your hands well and often.    Resources    [facility]: About COVID-19: [email or web address]    CDC: What to Do If You're Sick: www.cdc.gov/coronavirus/2019-ncov/about/steps-when-sick.html    CDC: Ending Home Isolation: www.cdc.gov/coronavirus/2019-ncov/hcp/disposition-in-home-patients.html     CDC: Caring for Someone: www.cdc.gov/coronavirus/2019-ncov/if-you-are-sick/care-for-someone.html     [facility]: Interim Guidance for Hospital Discharge to Home: www.health.[facility].mn.us/diseases/coronavirus/hcp/hospdischarge.pdf    [facility] clinical trials (COVID-19 research studies): clinicalaffairs.[facility]-clinical-trials     Below are the COVID-19 hotlines at the [facility] ([facility]). Interpreters are available.   o For health questions: Call 874-758-3715 or 1-143.462.3668 (7 a.m. to 7 p.m.)  o For questions about schools and childcare: Call 170-656-7264 or 1-768.389.8920 (7 a.m. to 7 p.m.)                       Reason for Disposition    Fever > 100.4 F (38.0  C)    Additional Information    Negative: Shock suspected (e.g., cold/pale/clammy skin, too weak to stand, low BP, rapid pulse)    Negative: Sounds like a life-threatening emergency to the triager    Negative: [1] Unable to urinate (or only a few drops) > 4 hours AND     [2] bladder feels very full (e.g., palpable bladder or strong urge to urinate)    Negative: [1] Decreased urination and [2] drinking very little AND [2] dehydration suspected (e.g., dark urine, no urine > 12 hours, very dry mouth, very lightheaded)    Negative: Patient sounds very sick or weak to the triager    Protocols used: URINARY SYMPTOMS-A-AH

## 2021-06-14 NOTE — PROGRESS NOTES
ROOM # 226    Living Situation (if not independent, order SW consult): IND  Facility name:  : [facility]) [phone]    Activity level at baseline: IND  Activity level on admit: SBA      Patient registered to observation; given Patient Bill of Rights; given the opportunity to ask questions about observation status and their plan of care.  Patient has been oriented to the observation room, bathroom and call light is in place.    Discussed discharge goals and expectations with patient/family.

## 2021-06-14 NOTE — PLAN OF CARE
PRIMARY DIAGNOSIS: PYELONEPHRITIS  OUTPATIENT/OBSERVATION GOALS TO BE MET BEFORE DISCHARGE:  Diagnostic test and consults (if applicable) complete: Yes    Vitals signs stable or return to baseline: No Bps high, fevers    Tolerate oral intake to maintain hydration: Yes    Pain status: Improved-controlled with oral pain medications.    Tolerating oral antibiotics or has plans for home infusion setup:  Yes    Return to near baseline physical activity: Yes    Discharge Planner Nurse   Safe discharge environment identified: Yes  Barriers to discharge: No       Entered by: Carmela Osullivan 06/14/2021 1:03 PM     Please review provider order for any additional goals.   Nurse to notify provider when observation goals have been met and patient is ready for discharge.    Pt is alert and oriented. Up SBA States pain is much improved after Dilaudid in the ED. Pt states that she has urgency and burning with urination. Pt had hematuria on arrival to the unit, she states this was the first occurrence.

## 2021-06-14 NOTE — LETTER
[facility] OBSERVATION DEPT  [address]  [facility] [address]  [phone] June 16, 2021    RE:  Disha Watson                                                                                                                                                       [id number]  [facility] [address]        To whom it may concern:    Disha Watson was admitted to the hospital under my care from 6/14/2021 to 6/16/2021. She will discharge home and will need to continue to recover there. She will need to remain out from work until Monday, June 21, 2021.      Sincerely,            Sukumar Shirley MD

## 2021-06-14 NOTE — H&P
[facility]    History and Physical - Hospitalist Service       Date of Admission:  6/14/2021    Assessment & Plan   Disha Watson is a 36 year old female with fibromyalgia admitted on 6/14/2021 with UTI and pyelonephritis    UTI with pyelonephritis    - UA in ED appears dirty    - clinically has a pyelonephritis (fever, flank pain)    - the only previous culture we have was many years ago: E Coli    - already received ceftriaxone in ED, will continue    - pending cultures     - IVF, pain control    Hypokalemia    - replaced in ED    - IVF with NS with KCL    Full Code  Will call and update her boyfriend Joshua [phone] (called, VM message stating VM not set up yet()     Diet: Regular Diet Adult    DVT Prophylaxis: Low Risk/Ambulatory with no VTE prophylaxis indicated  Patton Catheter: not present  Code Status: Full Code           Disposition Plan   Expected discharge: Tomorrow, recommended to prior living arrangement once antibiotic plan established.  Entered: Sukumar Shirley MD 06/14/2021, 11:46 AM     The patient's care was discussed with the Bedside Nurse, Patient, Patient's Family and ED Provider.    Sukumar Shirley MD  [facility]  Contact information available via [facility] Paging/Directory      ______________________________________________________________________    Chief Complaint   Flank pain, UTI    History is obtained from the patient    History of Present Illness   Disha Watson is a 36 year old female with fibromyalgia admitted on 6/14/2021 with UTI and pyelonephritis. Patient states her symptoms started 3 weeks ago. She took Azo and felt like her symptoms improved.  She states on Saturday she started having malodorous and discolored urine again.  She also had body aches including her neck and a migraine.  She then started having bilateral flank pain.  She was also reporting fevers up to 103.  She states she has had urinary tract infections in the  past.  she denies any chest pain, shortness of breath, abdominal pain she did vomit once on Saturday.  No diarrhea.  No URI symptoms.  Her pain has improved after pain medications in the emergency room.  It is now 3 out of 10.      Review of Systems    The 10 point Review of Systems is negative other than noted in the HPI or here.     Past Medical History    I have reviewed this patient's medical history and updated it with pertinent information if needed.   Past Medical History:   Diagnosis Date     Bipolar 2 disorder      Mixed hyperlipidemia        Past Surgical History   I have reviewed this patient's surgical history and updated it with pertinent information if needed.  Past Surgical History:   Procedure Laterality Date     BARIATRIC SURGERY       LAPAROSCOPIC CHOLECYSTECTOMY       Primary Low Transverse  Section  2001     REPAIR HERNIA WITH MESH         Social History   I have reviewed this patient's social history and updated it with pertinent information if needed.  Social History     Tobacco Use     Smoking status: Current Every Day Smoker     Packs/day: 1.00     Years: 5.00     Pack years: 5.00     Smokeless tobacco: Never Used     Tobacco comment: more when working   Substance Use Topics     Alcohol use: Yes     Comment: Social     Drug use: No       Family History   I have reviewed this patient's family history and updated it with pertinent information if needed.  Family History   Problem Relation Age of Onset     Diabetes Maternal Grandmother      Psychotic Disorder Mother      C.A.D. Paternal Aunt         MI,  age 40s     C.A.D. Paternal Grandmother      Lupus Paternal Aunt      Lupus Other         cousin       Prior to Admission Medications   Prior to Admission Medications   Prescriptions Last Dose Informant Patient Reported? Taking?   ibuprofen (ADVIL/MOTRIN) 200 MG tablet 2021 at Unknown time  Yes Yes   Sig: Take 800 mg by mouth every 8 hours as needed for mild pain    omeprazole (PRILOSEC OTC) 20 MG EC tablet 6/13/2021 at Unknown time Self Yes Yes   Sig: Take 20 mg by mouth daily      Facility-Administered Medications: None     Allergies   Allergies   Allergen Reactions     Morphine [Fumaric Acid] Itching and Swelling       Physical Exam   Vital Signs: Temp: 99.4  F (37.4  C) Temp src: Oral BP: (!) 163/86 Pulse: 89   Resp: 20 SpO2: 95 % O2 Device: None (Room air)    Weight: 242 lbs 12.8 oz    Constitutional: awake, alert, cooperative, no apparent distress, and appears stated age  Eyes: Lids and lashes normal, pupils equal, round and reactive to light, extra ocular muscles intact, sclera clear, conjunctiva normal  ENT: Normocephalic, without obvious abnormality, atraumatic, sinuses nontender on palpation, external ears without lesions, oral pharynx with moist mucous membranes, tonsils without erythema or exudates, gums normal and good dentition.  Respiratory: No increased work of breathing, good air exchange, clear to auscultation bilaterally, no crackles or wheezing  Cardiovascular: Normal apical impulse, regular rate and rhythm, normal S1 and S2, no S3 or S4, and no murmur noted  GI: No scars, normal bowel sounds, soft, non-distended, non-tender, no masses palpated, no hepatosplenomegally. No cva tenderness  Skin: no bruising or bleeding  Musculoskeletal: no lower extremity pitting edema present    Data   Data reviewed today: I reviewed all medications, new labs and imaging results over the last 24 hours. I personally reviewed no images or EKG's today.    Most Recent 3 CBC's:  Recent Labs   Lab Test 06/14/21 0752 04/22/13  1650   WBC 5.4 11.2*   HGB 12.8 13.4   * 87   * 310     Most Recent 3 BMP's:  Recent Labs   Lab Test 06/14/21 0752 04/22/13  1650    141   POTASSIUM 3.0* 3.9   CHLORIDE 102 101   CO2 29 25   BUN 5* 10   CR 0.58 0.57   ANIONGAP 4 14.7   NICK 8.3* 9.1   * 91     Most Recent 2 LFT's:  Recent Labs   Lab Test 06/14/21 0752  04/22/13  1650   AST 78* 22   ALT 56* 30   ALKPHOS 138 74   BILITOTAL 1.2 0.5     Most Recent Urinalysis:  Recent Labs   Lab Test 06/14/21  0757   COLOR Dark Yellow   APPEARANCE Slightly Cloudy   URINEGLC Negative   URINEBILI Negative   URINEKETONE Negative   SG 1.020   UBLD Small*   URINEPH 6.0   PROTEIN 50*   NITRITE Negative   LEUKEST Large*   RBCU 18*   WBCU >182*     No results found for this or any previous visit (from the past 24 hour(s)).

## 2021-06-14 NOTE — ED TRIAGE NOTES
ABCs intact. Pt c/o bilateral flank pain. Pt states she had a UTI about 3 weeks ago and took OTC Azo for it. Pt c/o fever, chills, shaking the past few days as well.

## 2021-06-14 NOTE — PLAN OF CARE
PRIMARY DIAGNOSIS: PYELONEPHRITIS  OUTPATIENT/OBSERVATION GOALS TO BE MET BEFORE DISCHARGE:  Diagnostic test and consults (if applicable) complete: Yes     Vitals signs stable or return to baseline: No Bps high, fevers     Tolerate oral intake to maintain hydration: Yes     Pain status: Not controlled with oral pain medications.     Tolerating oral antibiotics or has plans for home infusion setup:  Yes     Return to near baseline physical activity: Yes     Discharge Planner Nurse   Safe discharge environment identified: Yes  Barriers to discharge: No          Please review provider order for any additional goals.   Nurse to notify provider when observation goals have been met and patient is ready for discharge.     Pt is alert and oriented. Up SBA Gave oxycodone for pain 4/10, pt did not feel improvement, so Dilaudid was given also. Pt states that she has urgency and burning with urination. Pt had hematuria on arrival to the unit, she states this was the first occurrence of bleeding. Pt had a temp of 101.8 with chills, Tylenol given. Will continue to monitor.

## 2021-06-14 NOTE — ED NOTES
"[facility]  ED Nurse Handoff Report    Disha Watson is a 36 year old female   ED Chief complaint: Flank Pain  . ED Diagnosis:   Final diagnoses:   Sepsis without acute organ dysfunction, due to unspecified organism (H)   Pyelonephritis, acute     Allergies:   Allergies   Allergen Reactions     Morphine [Fumaric Acid] Itching and Swelling       Code Status: Full Code  Activity level - Baseline/Home:  Independent. Activity Level - Current:   Independent. Lift room needed: No. Bariatric: No   Needed: No   Isolation: No. Infection: Not Applicable.     Vital Signs:   Vitals:    06/14/21 0737 06/14/21 0840 06/14/21 0905   BP: (!) 161/111  (!) 156/89   Pulse: 129  95   Resp: 18  16   Temp: 103.5  F (39.7  C) 100.6  F (38.1  C)    TempSrc: Oral Oral    SpO2: 95%  95%   Weight: 108 kg (238 lb 1.6 oz)     Height: 1.6 m (5' 3\")         Cardiac Rhythm:  ,      Pain level:    Patient confused: No. Patient Falls Risk: No.   Elimination Status: Has voided   Patient Report - Initial Complaint: Bilateral flank pain, fever, chills. Focused Assessment: Gilmer flank pain  Tests Performed: labs, blood cultures. Abnormal Results:   Labs Ordered and Resulted from Time of ED Arrival Up to the Time of Departure from the ED   ROUTINE UA WITH MICROSCOPIC - Abnormal; Notable for the following components:       Result Value    Blood Urine Small (*)     Protein Albumin Urine 50 (*)     Urobilinogen mg/dL 4.0 (*)     Leukocyte Esterase Urine Large (*)     WBC Urine >182 (*)     RBC Urine 18 (*)     Bacteria Urine Many (*)     Squamous Epithelial /HPF Urine 3 (*)     Mucous Urine Present (*)     All other components within normal limits   CBC WITH PLATELETS DIFFERENTIAL - Abnormal; Notable for the following components:    RBC Count 3.68 (*)      (*)     MCH 34.8 (*)     RDW 15.4 (*)     Platelet Count 126 (*)     Absolute Lymphocytes 0.5 (*)     All other components within normal limits   BASIC METABOLIC PANEL - " Abnormal; Notable for the following components:    Potassium 3.0 (*)     Glucose 135 (*)     Urea Nitrogen 5 (*)     Calcium 8.3 (*)     All other components within normal limits   HEPATIC PANEL - Abnormal; Notable for the following components:    Bilirubin Direct 0.7 (*)     Albumin 2.9 (*)     ALT 56 (*)     AST 78 (*)     All other components within normal limits   LACTIC ACID WHOLE BLOOD   SARS-COV-2 (COVID-19) VIRUS RT-PCR   HCG QUALITATIVE   NURSING DRAW AND HOLD   BLOOD CULTURE   URINE CULTURE AEROBIC BACTERIAL   BLOOD CULTURE      Treatments provided: IV antibiotics, pain meds, IV fluids  Family Comments: none  OBS brochure/video discussed/provided to patient:  Yes  ED Medications:   Medications   lactated ringers BOLUS 1,000 mL (1,000 mLs Intravenous New Bag 6/14/21 0940)   ondansetron (ZOFRAN) injection 4 mg (4 mg Intravenous Given 6/14/21 0856)   HYDROmorphone (PF) (DILAUDID) injection 0.5 mg (0.5 mg Intravenous Given 6/14/21 0857)   acetaminophen (TYLENOL) tablet 975 mg (975 mg Oral Given 6/14/21 0746)   cefTRIAXone (ROCEPHIN) 2 g vial to attach to  ml bag for ADULTS or NS 50 ml bag for PEDS (0 g Intravenous Stopped 6/14/21 0931)   potassium chloride ER (KLOR-CON M) CR tablet 40 mEq (40 mEq Oral Given 6/14/21 0912)     Drips infusing:  No  For the majority of the shift, the patient's behavior Green. Interventions performed were none.    Sepsis treatment initiated: NA     Patient tested for COVID 19 prior to admission: YES    ED Nurse Name/Phone Number: Merry Norris RN,   10:27 AM    RECEIVING UNIT ED HANDOFF REVIEW    Above ED Nurse Handoff Report was reviewed: Yes  Reviewed by: Carmela Osullivan RN on June 14, 2021 at 11:11 AM

## 2021-06-14 NOTE — ED PROVIDER NOTES
History   Chief Complaint:  Flank Pain     HPI   Disha Watson is a 36 year old female with history of GERD, Hyperlipidemia and morbid obesity who presents with bilateral flank pain. The patient had a bladder infection three weeks ago. She took OTC Azo for this which relieved her symptoms. Yesterday, the patient began experiancing a fever with chills, increased urination and one episode of emesis. Additionally, she notes lower bilateral abdominal pain that radiates to her back. She denies any diarrhea but states she hasn't had a bowel movement in a few days. She currently rates her pain at 7/10. She denies any dysuria, chest pain or shortness of breath. Of note, she occasionally drinks alcohol and smokes tobacco.    Review of Systems   Constitutional: Positive for chills and fever.   Respiratory: Negative for shortness of breath.    Cardiovascular: Negative for chest pain.   Gastrointestinal: Positive for abdominal pain, constipation and vomiting. Negative for diarrhea.   Genitourinary: Positive for flank pain.   All other systems reviewed and are negative.    Allergies:  Morphine   Prednisone  Venom-honey bee  Duloxetine  Pregabalin  Tramadol    Medications:  Prilosec    Past Medical History:    Bipolar 2 disorder  Hyperlipidemia  Fibromyalgia  Human papillomavirus  Iron deficiency  GERD  Panic attacks  Trochanteric bursitis  Tobacco abuse  Morbid obesity  Anxiety    Past Surgical History:    Bariatric surgery  Laparoscopic cholecystectomy  Primary low transverse C section  Repair hernia with mesh    Family History:    Mother: Psychotic disorder, Alcohol abuse, Anxiety, Hyperlipidemia, Hypertension, Obesity, Sexual abuse  Father: Alcohol abuse, Anxiety, CAD, Depression, Hyperlipidemia, Hypertension, Obesity, Pulmonary fibrosis  Brother: Alcohol abuse, Depression, Drug abuse, Obesity    Social History:  The patient was unaccompanied to the ED.  Occassionally drinks alcohol and smokes tobacco.     Physical Exam  "    Patient Vitals for the past 24 hrs:   BP Temp Temp src Pulse Resp SpO2 Height Weight   06/14/21 0905 (!) 156/89 -- -- 95 16 95 % -- --   06/14/21 0840 -- 100.6  F (38.1  C) Oral -- -- -- -- --   06/14/21 0737 (!) 161/111 103.5  F (39.7  C) Oral 129 18 95 % 1.6 m (5' 3\") 108 kg (238 lb 1.6 oz)       Physical Exam  Constitutional: Well developed, nontox appearance  Head: Atraumatic.   Neck:  no stridor  Eyes: no scleral icterus  Cardiovascular: Reg tachycardia, 2+ bilat radial pulses  Pulmonary/Chest: nml resp effort, Clear BS bilat  Abdominal: ND, soft, BLQ tenderness, no rebound or guarding   : CVA tenderness bilat  Ext: Warm, well perfused, no edema  Neurological: A&O, symmetric facies, moves ext x4  Skin: Skin is warm and dry.   Psychiatric: Behavior is normal. Thought content normal.   Nursing note and vitals reviewed.    Emergency Department Course   Laboratory:  UA with Microscopic: Blood Small (A), Protein Albumin 50 (A), Urobilinogen 4.0 (H), Leukocyte Esterase Large (A), WBC >182 (H), RBC 18 (H), Bacteria Many (A), Squamous Epithelial 3 (H), Mucous Urine Present (A) o/w Negative    CBC: WBC 5.4, HGB 12.8,  (L)  BMP: Potassium 3.0 (L), Glucose 135 (H), BUN 5 (L), Calcium 8.3 (L) o/w WNL (Creatinine 0.58)    Lactic acid (Collected 0752): 1.5    Hepatic Panel: Bilirubin 0.7 (H), Albumin 2.9 (L), ALT 56 (H), AST 78 (H) o/w WNL    Asymptomatic COVID-19 Virus (Coronavirus) by PCR Nasopharyngeal swab: Negative    Blood Cultures x2: Pending    Urine Culture Aerobic Bacterial: Pending    Emergency Department Course:  Reviewed:  I reviewed nursing notes, vitals, past medical history and care everywhere    Assessments:  0829 I obtained history and examined the patient as noted above.     1011 I rechecked and updated the patient regarding the laboratory results and the plan for care.    Consults:   1850 I spoke with the hospitalist, Dr. Shirley, regarding the patient.    Interventions:  0746 Tylenol 975mg " PO  0856 Zofran 4mg IV  0857 Dilaudid 0.5mg IV  0901 Rocephin 2g IV  0912 Potassium chloride 40mEq PO    Disposition:  The patient was admitted to the hospital under the care of Dr. Shirley.     Impression & Plan   Covid-19  Disha Watson was evaluated during a global COVID-19 pandemic, which necessitated consideration that the patient might be at risk for infection with the SARS-CoV-2 virus that causes COVID-19.   Applicable protocols for evaluation were followed during the patient's care.   COVID-19 was considered as part of the patient's evaluation. The plan for testing is:  a test was obtained during this visit.    CMS Diagnoses: none    Medical Decision Makin year old female presenting w/ fever, bilateral flank pain, urinary symptoms     DDx includes UTI, pyelonephritis, bacteremia, sepsis, severe sepsis, electrolyte abnormality, kidney dysfunction.  Less likely ureteral stone given history of cystitis, symptoms consistent with cystitis and treated without antibiotics, nonfocal pain with bilateral flank and abdominal tenderness.  Labs significant for normal white blood cell count, normal lactate level, creatinine within normal limits, mild thrombocytopenia, minimal elevation of LFTs likely insignificant.  Antibiotics given as noted above.  Given no evidence of severe sepsis, septic shock or severe focal pain indicative of a ureteral stone, imaging deferred.  Given sepsis, pyelonephritis and need for further symptom control, pt admitted to the hospitalist service for further evaluation and mgmt.  Pt counseled on all results, disposition and diagnosis.  They are understanding and agreeable to plan. Patient admitted in stable condition.      Diagnosis:    ICD-10-CM    1. Sepsis without acute organ dysfunction, due to unspecified organism (H)  A41.9 Blood culture     Urine Culture     Blood culture   2. Pyelonephritis, acute  N10        Scribe Disclosure:  Darwin PEREZ, am serving as a scribe at 8:19 AM  on 6/14/2021 to document services personally performed by Steven Paredes MD based on my observations and the provider's statements to me.      Steven Paredes MD  06/14/21 6141

## 2021-06-15 ENCOUNTER — APPOINTMENT (OUTPATIENT)
Dept: CT IMAGING | Facility: CLINIC | Age: 36
End: 2021-06-15
Attending: SPECIALIST
Payer: COMMERCIAL

## 2021-06-15 LAB
ANION GAP SERPL CALCULATED.3IONS-SCNC: 5 MMOL/L (ref 3–14)
ANISOCYTOSIS BLD QL SMEAR: SLIGHT
BACTERIA SPEC CULT: ABNORMAL
BASOPHILS # BLD AUTO: 0 10E9/L (ref 0–0.2)
BASOPHILS NFR BLD AUTO: 1 %
BUN SERPL-MCNC: 3 MG/DL (ref 7–30)
CALCIUM SERPL-MCNC: 7.7 MG/DL (ref 8.5–10.1)
CHLORIDE SERPL-SCNC: 102 MMOL/L (ref 94–109)
CO2 SERPL-SCNC: 28 MMOL/L (ref 20–32)
CREAT SERPL-MCNC: 0.55 MG/DL (ref 0.52–1.04)
DIFFERENTIAL METHOD BLD: ABNORMAL
EOSINOPHIL # BLD AUTO: 0 10E9/L (ref 0–0.7)
EOSINOPHIL NFR BLD AUTO: 0 %
ERYTHROCYTE [DISTWIDTH] IN BLOOD BY AUTOMATED COUNT: 15.3 % (ref 10–15)
GFR SERPL CREATININE-BSD FRML MDRD: >90 ML/MIN/{1.73_M2}
GLUCOSE SERPL-MCNC: 97 MG/DL (ref 70–99)
HCT VFR BLD AUTO: 33.2 % (ref 35–47)
HGB BLD-MCNC: 10.8 G/DL (ref 11.7–15.7)
LACTATE BLD-SCNC: 1 MMOL/L (ref 0.7–2)
LYMPHOCYTES # BLD AUTO: 0.4 10E9/L (ref 0.8–5.3)
LYMPHOCYTES NFR BLD AUTO: 10 %
Lab: ABNORMAL
MACROCYTES BLD QL SMEAR: PRESENT
MCH RBC QN AUTO: 33.6 PG (ref 26.5–33)
MCHC RBC AUTO-ENTMCNC: 32.5 G/DL (ref 31.5–36.5)
MCV RBC AUTO: 103 FL (ref 78–100)
METAMYELOCYTES # BLD: 0 10E9/L
METAMYELOCYTES NFR BLD MANUAL: 1 %
MONOCYTES # BLD AUTO: 0.1 10E9/L (ref 0–1.3)
MONOCYTES NFR BLD AUTO: 3 %
NEUTROPHILS # BLD AUTO: 3.1 10E9/L (ref 1.6–8.3)
NEUTROPHILS NFR BLD AUTO: 85 %
PLATELET # BLD AUTO: 103 10E9/L (ref 150–450)
PLATELET # BLD EST: ABNORMAL 10*3/UL
POTASSIUM SERPL-SCNC: 3.5 MMOL/L (ref 3.4–5.3)
RBC # BLD AUTO: 3.21 10E12/L (ref 3.8–5.2)
SODIUM SERPL-SCNC: 135 MMOL/L (ref 133–144)
SPECIMEN SOURCE: ABNORMAL
WBC # BLD AUTO: 3.7 10E9/L (ref 4–11)

## 2021-06-15 PROCEDURE — 250N000013 HC RX MED GY IP 250 OP 250 PS 637: Performed by: HOSPITALIST

## 2021-06-15 PROCEDURE — 36415 COLL VENOUS BLD VENIPUNCTURE: CPT | Performed by: HOSPITALIST

## 2021-06-15 PROCEDURE — 80048 BASIC METABOLIC PNL TOTAL CA: CPT | Performed by: HOSPITALIST

## 2021-06-15 PROCEDURE — 99233 SBSQ HOSP IP/OBS HIGH 50: CPT | Performed by: HOSPITALIST

## 2021-06-15 PROCEDURE — 250N000011 HC RX IP 250 OP 636: Performed by: HOSPITALIST

## 2021-06-15 PROCEDURE — 250N000011 HC RX IP 250 OP 636: Performed by: INTERNAL MEDICINE

## 2021-06-15 PROCEDURE — 74176 CT ABD & PELVIS W/O CONTRAST: CPT

## 2021-06-15 PROCEDURE — G0378 HOSPITAL OBSERVATION PER HR: HCPCS

## 2021-06-15 PROCEDURE — 83605 ASSAY OF LACTIC ACID: CPT | Performed by: HOSPITALIST

## 2021-06-15 PROCEDURE — 96376 TX/PRO/DX INJ SAME DRUG ADON: CPT

## 2021-06-15 PROCEDURE — 96361 HYDRATE IV INFUSION ADD-ON: CPT

## 2021-06-15 PROCEDURE — 250N000013 HC RX MED GY IP 250 OP 250 PS 637: Performed by: PHYSICIAN ASSISTANT

## 2021-06-15 PROCEDURE — 85025 COMPLETE CBC W/AUTO DIFF WBC: CPT | Performed by: HOSPITALIST

## 2021-06-15 PROCEDURE — 120N000004 HC R&B MS OVERFLOW

## 2021-06-15 PROCEDURE — 87040 BLOOD CULTURE FOR BACTERIA: CPT | Performed by: HOSPITALIST

## 2021-06-15 RX ORDER — HYDROXYZINE HYDROCHLORIDE 25 MG/1
25 TABLET, FILM COATED ORAL 3 TIMES DAILY PRN
Status: DISCONTINUED | OUTPATIENT
Start: 2021-06-15 | End: 2021-06-16 | Stop reason: HOSPADM

## 2021-06-15 RX ORDER — CEFTRIAXONE 2 G/1
2 INJECTION, POWDER, FOR SOLUTION INTRAMUSCULAR; INTRAVENOUS EVERY 24 HOURS
Status: DISCONTINUED | OUTPATIENT
Start: 2021-06-15 | End: 2021-06-16 | Stop reason: HOSPADM

## 2021-06-15 RX ORDER — LISINOPRIL 10 MG/1
10 TABLET ORAL DAILY
Status: DISCONTINUED | OUTPATIENT
Start: 2021-06-15 | End: 2021-06-16 | Stop reason: HOSPADM

## 2021-06-15 RX ORDER — OXYCODONE HYDROCHLORIDE 5 MG/1
5-10 TABLET ORAL
Status: DISCONTINUED | OUTPATIENT
Start: 2021-06-15 | End: 2021-06-16 | Stop reason: HOSPADM

## 2021-06-15 RX ORDER — PROCHLORPERAZINE MALEATE 5 MG
5 TABLET ORAL EVERY 6 HOURS PRN
Status: DISCONTINUED | OUTPATIENT
Start: 2021-06-15 | End: 2021-06-16 | Stop reason: HOSPADM

## 2021-06-15 RX ORDER — PROCHLORPERAZINE 25 MG
25 SUPPOSITORY, RECTAL RECTAL EVERY 12 HOURS PRN
Status: DISCONTINUED | OUTPATIENT
Start: 2021-06-15 | End: 2021-06-16 | Stop reason: HOSPADM

## 2021-06-15 RX ADMIN — OXYCODONE HYDROCHLORIDE 10 MG: 5 TABLET ORAL at 16:22

## 2021-06-15 RX ADMIN — OXYCODONE HYDROCHLORIDE 5 MG: 5 TABLET ORAL at 01:20

## 2021-06-15 RX ADMIN — HYDROXYZINE HYDROCHLORIDE 25 MG: 25 TABLET, FILM COATED ORAL at 20:07

## 2021-06-15 RX ADMIN — ONDANSETRON 4 MG: 4 TABLET, ORALLY DISINTEGRATING ORAL at 16:22

## 2021-06-15 RX ADMIN — ONDANSETRON 4 MG: 4 TABLET, ORALLY DISINTEGRATING ORAL at 11:01

## 2021-06-15 RX ADMIN — OMEPRAZOLE 20 MG: 20 CAPSULE, DELAYED RELEASE ORAL at 08:34

## 2021-06-15 RX ADMIN — OXYCODONE HYDROCHLORIDE 10 MG: 5 TABLET ORAL at 05:33

## 2021-06-15 RX ADMIN — ACETAMINOPHEN 650 MG: 325 TABLET, FILM COATED ORAL at 18:14

## 2021-06-15 RX ADMIN — LISINOPRIL 10 MG: 10 TABLET ORAL at 08:43

## 2021-06-15 RX ADMIN — OXYCODONE HYDROCHLORIDE 10 MG: 5 TABLET ORAL at 08:43

## 2021-06-15 RX ADMIN — ACETAMINOPHEN 650 MG: 325 TABLET, FILM COATED ORAL at 05:40

## 2021-06-15 RX ADMIN — CEFTRIAXONE 2 G: 2 INJECTION, POWDER, FOR SOLUTION INTRAMUSCULAR; INTRAVENOUS at 08:34

## 2021-06-15 RX ADMIN — ONDANSETRON HYDROCHLORIDE 4 MG: 2 INJECTION, SOLUTION INTRAMUSCULAR; INTRAVENOUS at 05:33

## 2021-06-15 RX ADMIN — OXYCODONE HYDROCHLORIDE 10 MG: 5 TABLET ORAL at 12:48

## 2021-06-15 RX ADMIN — OXYCODONE HYDROCHLORIDE 10 MG: 5 TABLET ORAL at 21:40

## 2021-06-15 RX ADMIN — ENOXAPARIN SODIUM 40 MG: 40 INJECTION SUBCUTANEOUS at 11:02

## 2021-06-15 ASSESSMENT — MIFFLIN-ST. JEOR: SCORE: 1789.93

## 2021-06-15 NOTE — PLAN OF CARE
"PRIMARY DIAGNOSIS: PYELONEPHRITIS  OUTPATIENT/OBSERVATION GOALS TO BE MET BEFORE DISCHARGE:  1. Diagnostic test and consults (if applicable) complete: Yes     2. Vitals signs stable or return to baseline: No Bps high, fevers     3. Tolerate oral intake to maintain hydration: Yes     4. Pain status: Present, but controlled with oral pain medications.     5. Tolerating oral antibiotics or has plans for home infusion setup:  Yes. Currently receiving IV Rocephin.     6. Return to near baseline physical activity: Yes     Discharge Planner Nurse   Safe discharge environment identified: Yes  Barriers to discharge: No          Please review provider order for any additional goals.   Nurse to notify provider when observation goals have been met and patient is ready for discharge.     BP (!) 155/91 (BP Location: Right arm)   Pulse 101   Temp 101.4  F (38.6  C) (Oral)   Resp 18   Ht 1.6 m (5' 3\")   Wt 114.1 kg (251 lb 8 oz)   LMP 05/23/2021   SpO2 91%   BMI 44.55 kg/m      Pt is alert and oriented x4. Up with SBA. Pain located to back, flank, and groin region. Continued hematuria/dark brown urine noted throughout the night. Reporting urgency and burning with urination. High temps. T-max= 101.4. PRN tylenol administered with some relief. States 10mg oxycodone is sufficient, but wears off quickly. N/V noted x2 last night after pain medication administration. PRN zofran administered with effectiveness. NS+20 KCL infusing at 100 mL/hr.  Will continue to provide supportive cares and pain management. Labs to be redrawn tomorrow morning.    See note regarding critical value - blood cultures preliminary growing E. Coli. Admitting Paer (Hospitalist) paged.  "

## 2021-06-15 NOTE — PLAN OF CARE
DATE:  6/15/2021   TIME OF RECEIPT FROM LAB:  03:54AM  LAB TEST:  Update on positive blood cultures  LAB VALUE: BC from 6/14 at 07:52AM from left arm - preliminary results positive for E. Coli  RESULTS GIVEN WITH READ-BACK TO (PROVIDER):  Admitting pager (hospitalist)  TIME LAB VALUE REPORTED TO PROVIDER:   03:55AM

## 2021-06-15 NOTE — PLAN OF CARE
"PRIMARY DIAGNOSIS: PYELONEPHRITIS  OUTPATIENT/OBSERVATION GOALS TO BE MET BEFORE DISCHARGE:  1. Diagnostic test and consults (if applicable) complete: Yes     2. Vitals signs stable or return to baseline: No Bps high, fevers     3. Tolerate oral intake to maintain hydration: Yes     4. Pain status: Present, but controlled with oral pain medications.     5. Tolerating oral antibiotics or has plans for home infusion setup:  Yes. Currently receiving IV Rocephin.     6. Return to near baseline physical activity: Yes     Discharge Planner Nurse   Safe discharge environment identified: Yes  Barriers to discharge: No          Please review provider order for any additional goals.   Nurse to notify provider when observation goals have been met and patient is ready for discharge.     BP (!) 144/83 (BP Location: Right arm)   Pulse 95   Temp 100.9  F (38.3  C) (Oral)   Resp 16   Ht 1.6 m (5' 3\")   Wt 110.1 kg (242 lb 12.8 oz)   LMP 05/23/2021   SpO2 95%   BMI 43.01 kg/m      Pt is alert and oriented x4. Up with SBA. Pain located to back, flank, and groin region. Continued hematuria/dark brown urine noted throughout the night. Reporting urgency and burning with urination. High temps. T-max= 101.4. PRN tylenol administered with some relief. States 10mg oxycodone is sufficient, but wears off quickly. N/V noted x2 last night after pain medication administration. PRN zofran administered with effectiveness. NS+20 KCL infusing at 100 mL/hr.  Will continue to provide supportive cares and pain management. Labs to be redrawn tomorrow morning.  "

## 2021-06-15 NOTE — UTILIZATION REVIEW
Admission Status; Secondary Review Determination    Under the authority of the Utilization Management Committee, the utilization review process indicated a secondary review on the above patient. The review outcome is based on review of the medical records, discussions with staff, and applying clinical experience noted on the date of the review.    (x) Inpatient Status Appropriate - This patient's medical care is consistent with medical management for inpatient care and reasonable inpatient medical practice.    RATIONALE FOR DETERMINATION: 36 year old female with fibromyalgia, morbid obesity who presents with bilateral flank pain  admitted on 6/14/2021 with pyelonephritis with bacteremia and recurrent high fever appropriate for IP care.    At the time of admission with the information available to the attending physician more than 2 nights Hospital complex care was anticipated, based on patient risk of adverse outcome if treated as outpatient and complex care required. Inpatient admission is appropriate based on the Medicare guidelines.    This document was produced using voice recognition software    The information on this document is developed by the utilization review team in order for the business office to ensure compliance. This only denotes the appropriateness of proper admission status and does not reflect the quality of care rendered.    The definitions of Inpatient Status and Observation Status used in making the determination above are those provided in the CMS Coverage Manual, Chapter 1 and Chapter 6, section 70.4.    Sincerely,    Eriberto Herron MD  Utilization Review  Physician Advisor  [facility].

## 2021-06-15 NOTE — PROGRESS NOTES
[facility]    Medicine Progress Note - Hospitalist Service       Date of Admission:  6/14/2021  Assessment & Plan       Disha Watson is a 36 year old female with fibromyalgia admitted on 6/14/2021 with UTI and pyelonephritis. Now found to be bacteremia     UTI with pyelonephritis and bacteremia    - urine and blood (left arm, 6/14, 8pm) growing E Coli    - on ceftriaxone Day #2, pending sensitivities    - had 2nd blood culture 6/14, 9pm of right arm that is negative so far    - will repeat blood cultures today    - will need longer course of antibiotics due to bacteremia    - will have ID see patient to dictate course of antibiotics    - pain control with oral oxy, tylenol, motrin and IV dilaudid    Hypokalemia    - replaced in ED    - IVF with NS with KCL    - potassium stable today    HTN    - patient does not carry this diagnosis    - has been persistently hypertensive here      - will start lisinopril and adjust as needed    Admit to inpatient for bacteremia, need for continued IV antibiotics    She requested an email be sent to  at her place of employment. This was completed.     Diet: Regular Diet Adult    DVT Prophylaxis: Enoxaparin (Lovenox) SQ  Patton Catheter: not present  Code Status: Full Code           Disposition Plan   Expected discharge: 1-2 days, recommended to prior living arrangement once antibiotic plan established.  Entered: Sukumar Shirley MD 06/15/2021, 8:26 AM       The patient's care was discussed with the Patient, Patient's Family and ID Consultant.    Sukumar Shirley MD  Hospitalist Service  [facility]  Contact information available via [facility] Paging/Directory    ______________________________________________________________________    Interval History   Patient doing better. Pain 3/10. No chest pain, sob. Eating, but not much appetite.     Data reviewed today: I reviewed all medications, new labs and imaging results over the last 24  hours. I personally reviewed no images or EKG's today.    Physical Exam   Vital Signs: Temp: 100.5  F (38.1  C) Temp src: Axillary BP: (!) 167/102 Pulse: 54   Resp: 17 SpO2: 96 % O2 Device: None (Room air)    Weight: 251 lbs 8 oz  Constitutional: awake, alert, cooperative, no apparent distress, and appears stated age  Eyes: Lids and lashes normal, pupils equal, round and reactive to light, extra ocular muscles intact, sclera clear, conjunctiva normal  ENT: Normocephalic, without obvious abnormality, atraumatic, sinuses nontender on palpation, external ears without lesions, oral pharynx with moist mucous membranes, tonsils without erythema or exudates, gums normal and good dentition.  Respiratory: No increased work of breathing, good air exchange, clear to auscultation bilaterally, no crackles or wheezing  Cardiovascular: Normal apical impulse, regular rate and rhythm, normal S1 and S2, no S3 or S4, and no murmur noted  GI: No scars, normal bowel sounds, soft, non-distended, non-tender, no masses palpated, no hepatosplenomegally  Skin: no bruising or bleeding  Musculoskeletal: no lower extremity pitting edema present    Data   Recent Labs   Lab 06/15/21  0552 06/14/21  0752   WBC 3.7* 5.4   HGB 10.8* 12.8   * 102*   * 126*    135   POTASSIUM 3.5 3.0*   CHLORIDE 102 102   CO2 28 29   BUN 3* 5*   CR 0.55 0.58   ANIONGAP 5 4   NICK 7.7* 8.3*   GLC 97 135*   ALBUMIN  --  2.9*   PROTTOTAL  --  7.5   BILITOTAL  --  1.2   ALKPHOS  --  138   ALT  --  56*   AST  --  78*     No results found for this or any previous visit (from the past 24 hour(s)).

## 2021-06-15 NOTE — CONSULTS
[facility]    Infectious Disease Consultation     Date of Admission:  6/14/2021  Date of Consult : 06/15/21    Assessment  1. E.coli sepsis of urinary origin  2. Pyelonephritis. Given complicated clinical picture with sepsis  Prolonged symptoms and bilateral flank and groin pain, will need to obtain CT scan to rule out stones or other complications like nephric/perinephric abscess  3. Leukopenia likely due to infection. Antibiotics may be contributing. Will need close monitoring of WBC   4. Anemia  5. Hypokalemia  6. Transaminitis may be sepsis related    Recommendations  1. CT abdomen stone protocol  2. Continue Ceftriaxone  3. Follow sensitivities on E.coli isolate  4. Follow WBC count in am  5. If CT stable and patient remains afebrile, could consider discharge on oral Ciprofloxacin (if isolate sensitive) for a total of 7-10 days if antibiotics (day 2 today)  6. Check LFTs in am. If persistent elevation check Hepatitis B/C serologies    Yessi Huitron MD    Reason for Consult    I was asked to evaluate this patient for antimicrobial recommendations for gram negative sepsis    Primary Care Physician   Odalis Hall    Chief Complaint   Fever and flank pain.    History is obtained from the patient and medical records    History of Present Illness   Disha Watson is a 36 year old female who is admitted with a 3 weeks history of malodorous urine and dysuria. She developed symptoms of acute cystitis with dysuria burning with urination, frequency 3 weeks ago and took azo with some improvement. 3 days ago she developed acute onset symptoms with body aches and fever of 103 , vomiting and bilateral flank pain and groin pain and was hospitalized on 6/14 with a diagnosed of pyelonephritis. Subsequently blood cxs have been positive for E.coli. She remains febrile, but fever pattern is improving. She has developed slight leukopenia continues to have nausea and flank pain. She is maintained on IV  Ceftriaxone and ID has been asked to assist with antibiotic management    Antimicrobial therapy   Ceftriaxone    Past Medical History   I have reviewed this patient's medical history and updated it with pertinent information if needed.   Past Medical History:   Diagnosis Date     Bipolar 2 disorder      Mixed hyperlipidemia      Past Surgical History   I have reviewed this patient's surgical history and updated it with pertinent information if needed.  Past Surgical History:   Procedure Laterality Date     BARIATRIC SURGERY       LAPAROSCOPIC CHOLECYSTECTOMY       Primary Low Transverse  Section  2001     REPAIR HERNIA WITH MESH       Prior to Admission Medications   Prior to Admission Medications   Prescriptions Last Dose Informant Patient Reported? Taking?   ibuprofen (ADVIL/MOTRIN) 200 MG tablet 2021 at Unknown time  Yes Yes   Sig: Take 800 mg by mouth every 8 hours as needed for mild pain   omeprazole (PRILOSEC OTC) 20 MG EC tablet 2021 at Unknown time Self Yes Yes   Sig: Take 20 mg by mouth daily      Facility-Administered Medications: None     Allergies   Allergies   Allergen Reactions     Morphine [Fumaric Acid] Itching and Swelling     Immunization History   Immunization History   Administered Date(s) Administered     HepB 1995     Historical mumps 1995     Influenza (IIV3) PF 2008, 10/28/2011     Measles 1995     Rubella 1995     TDAP Vaccine (Adacel) 10/28/2011     Social History   I have reviewed this patient's social history and updated it with pertinent information if needed. Disha Watson  reports that she has been smoking. She has a 5.00 pack-year smoking history. She has never used smokeless tobacco. She reports current alcohol use. She reports that she does not use drugs.    Family History   I have reviewed this patient's family history and updated it with pertinent information if needed.   Family History   Problem Relation Age of Onset      Diabetes Maternal Grandmother      Psychotic Disorder Mother      C.A.D. Paternal Aunt         MI,  age 40s     C.A.D. Paternal Grandmother      Lupus Paternal Aunt      Lupus Other         cousin     Review of Systems   The 10 point Review of Systems is negative other than noted in the HPI or here.     Physical Exam   Temp: 98.7  F (37.1  C) Temp src: Oral BP: (!) 148/95 Pulse: 55   Resp: 18 SpO2: 97 % O2 Device: None (Room air)    Vital Signs with Ranges  Temp:  [98.7  F (37.1  C)-101.8  F (38.8  C)] 98.7  F (37.1  C)  Pulse:  [] 55  Resp:  [16-20] 18  BP: (143-174)/() 148/95  SpO2:  [91 %-99 %] 97 %  251 lbs 8 oz  Body mass index is 44.55 kg/m .    GENERAL APPEARANCE:  awake  EYES: Eyes grossly normal to inspection, PERRL and conjunctivae and sclerae normal  NECK: no adenopathy, no asymmetry, masses, or scars and thyroid normal to palpation  RESP: lungs clear to auscultation - no rales, rhonchi or wheezes  CV: regular rates and rhythm, normal S1 S2, no S3 or S4 and no murmur, click or rub  LYMPHATICS: normal ant/post cervical and supraclavicular nodes  ABDOMEN: soft, nontender, bilateral CVA tenderness  MS: extremities normal- no gross deformities noted  SKIN: no suspicious lesions or rashes    Data   All laboratory data reviewed  Recent Labs   Lab 06/15/21  0911 06/15/21  0908 21  0853 21  0757 21  0752   CULT PENDING PENDING No growth after 13 hours >100,000 colonies/mL  Escherichia coli  Susceptibility testing in progress  * Cultured on the 1st day of incubation:  Gram negative rods  *  Critical Value/Significant Value, preliminary result only, called to and read back by  Edilma Sandoval Rn at 0130 6.15.21. amd    Susceptibility testing in progress  (Note)  POSITIVE for E.COLI by Verigene multiplex nucleic acid test. Final  identification and antimicrobial susceptibility testing will be  verified by standard methods. Verigene test will not distinguish  E.coli from  Shigella species including S.dysenteriae, S.flexneri,  S.boydii, and S.sonnei. Specimens containing Shigella species or  E.coli will be reported as Positive for E.coli.    Specimen tested with Verigene multiplex, gram-negative blood culture  nucleic acid test for the following targets: Acinetobacter sp.,  Citrobacter sp., Enterobacter sp., Proteus sp., E. coli, K.  pneumoniae/oxytoca, P. aeruginosa, and the following resistance  markers: CTXM, KPC, NDM, VIM, IMP and OXA.    Critical Value/Significant Value called to and read back by Yanely Muñoz RN at 0353 6.15.21. amd       Recent Labs   Lab Test 06/15/21  0911 06/15/21  0908 06/14/21  0853 06/14/21  0757 06/14/21  0752 04/22/13  1540   CULT PENDING PENDING No growth after 13 hours >100,000 colonies/mL  Escherichia coli  Susceptibility testing in progress  * Cultured on the 1st day of incubation:  Gram negative rods  *  Critical Value/Significant Value, preliminary result only, called to and read back by  Edilma Sandoval Rn at 0130 6.15.21. amd    Susceptibility testing in progress  (Note)  POSITIVE for E.COLI by Verigene multiplex nucleic acid test. Final  identification and antimicrobial susceptibility testing will be  verified by standard methods. Verigene test will not distinguish  E.coli from Shigella species including S.dysenteriae, S.flexneri,  S.boydii, and S.sonnei. Specimens containing Shigella species or  E.coli will be reported as Positive for E.coli.    Specimen tested with Verigene multiplex, gram-negative blood culture  nucleic acid test for the following targets: Acinetobacter sp.,  Citrobacter sp., Enterobacter sp., Proteus sp., E. coli, K.  pneumoniae/oxytoca, P. aeruginosa, and the following resistance  markers: CTXM, KPC, NDM, VIM, IMP and OXA.    Critical Value/Significant Value called to and read back by Yanely Muñoz RN at 0353 6.15.21. amd   10 to 50,000 colonies/mL Mixed gram positive jackelin Multiple species present, probable perineal  contamination. Susceptibility testing  not routinely done

## 2021-06-15 NOTE — PLAN OF CARE
"PRIMARY DIAGNOSIS: PYELONEPHRITIS  GOALS TO BE MET BEFORE DISCHARGE:    1. Diagnostic test and consults (if applicable) complete: Yes     2. Vitals signs stable or return to baseline: No Bps high, fevers     3. Tolerate oral intake to maintain hydration: Yes     4. Pain status: Present, but controlled with oral pain medications.     5. Tolerating oral antibiotics or has plans for home infusion setup:  Yes. Currently receiving IV Rocephin.     6. Return to near baseline physical activity: Yes     Discharge Planner Nurse   Safe discharge environment identified: Yes  Barriers to discharge: No           Pt is alert and oriented x4. Up with SBA. Pain located to back, flank, and groin. Pt still reporting urgency and burning with urination. Highest temp on this shift so far is 99.0. Patient has had a decrease in pain today with 10mg Oxycodone Q3. Mildly nauseous this am, PRN zofran administered with effectiveness. Patient lost IV access today while showering. IVF have been DC'd as patient is drinking plenty of fluids. She needs IV access for antibiotics tomorrow but asked if she could wait until tomorrow for comfort. MD Ok'd.      Update 16:35  ID ordered a CT to rule out Kidney stones since the patient continues to have flank pain. Oxy given for pain and Zofran given for continued nausea.    Update 18:15  Pt states she feels \"crummy\" Temp 101.2. Tylenol and cold cloth given.  "

## 2021-06-15 NOTE — PLAN OF CARE
"PRIMARY DIAGNOSIS: PYELONEPHRITIS  OUTPATIENT/OBSERVATION GOALS TO BE MET BEFORE DISCHARGE:  1. Diagnostic test and consults (if applicable) complete: Yes     2. Vitals signs stable or return to baseline: No Bps high, fevers     3. Tolerate oral intake to maintain hydration: Yes     4. Pain status: Present, but controlled with oral pain medications.     5. Tolerating oral antibiotics or has plans for home infusion setup:  Yes. Currently receiving IV Rocephin.     6. Return to near baseline physical activity: Yes     Discharge Planner Nurse   Safe discharge environment identified: Yes  Barriers to discharge: No          Please review provider order for any additional goals.   Nurse to notify provider when observation goals have been met and patient is ready for discharge.     BP (!) 155/91 (BP Location: Right arm)   Pulse 101   Temp 101.4  F (38.6  C) (Oral)   Resp 18   Ht 1.6 m (5' 3\")   Wt 114.1 kg (251 lb 8 oz)   LMP 05/23/2021   SpO2 91%   BMI 44.55 kg/m      Pt is alert and oriented x4. Up with SBA. Pain located to back, flank, and groin region. Continued hematuria/dark brown urine noted throughout the night. Reporting urgency and burning with urination. High temps. T-max= 101.4. PRN tylenol administered with some relief. States 10mg oxycodone is sufficient, but wears off quickly. N/V noted x2 last night after pain medication administration. PRN zofran administered with effectiveness. NS+20 KCL infusing at 100 mL/hr.  Will continue to provide supportive cares and pain management. Labs to be redrawn tomorrow morning.  "

## 2021-06-15 NOTE — PROGRESS NOTES
[facility]:    Paged for GNR + Blood culture 1/2. Patient is already on ceftriaxone. Increase abx therapy to 2 gm given bacteremia.     Addendum 0406: Cultures E. Coli. On appropriate abx therapy.

## 2021-06-16 VITALS
SYSTOLIC BLOOD PRESSURE: 153 MMHG | DIASTOLIC BLOOD PRESSURE: 101 MMHG | HEART RATE: 78 BPM | TEMPERATURE: 98.5 F | WEIGHT: 251.5 LBS | OXYGEN SATURATION: 91 % | RESPIRATION RATE: 18 BRPM | HEIGHT: 63 IN | BODY MASS INDEX: 44.56 KG/M2

## 2021-06-16 LAB
ALBUMIN SERPL-MCNC: 2.3 G/DL (ref 3.4–5)
ALP SERPL-CCNC: 114 U/L (ref 40–150)
ALT SERPL W P-5'-P-CCNC: 68 U/L (ref 0–50)
AST SERPL W P-5'-P-CCNC: 94 U/L (ref 0–45)
BILIRUB DIRECT SERPL-MCNC: 0.8 MG/DL (ref 0–0.2)
BILIRUB SERPL-MCNC: 1.2 MG/DL (ref 0.2–1.3)
CREAT SERPL-MCNC: 0.49 MG/DL (ref 0.52–1.04)
GFR SERPL CREATININE-BSD FRML MDRD: >90 ML/MIN/{1.73_M2}
PLATELET # BLD AUTO: 96 10E9/L (ref 150–450)
PROT SERPL-MCNC: 6 G/DL (ref 6.8–8.8)

## 2021-06-16 PROCEDURE — 250N000011 HC RX IP 250 OP 636: Performed by: HOSPITALIST

## 2021-06-16 PROCEDURE — 82565 ASSAY OF CREATININE: CPT | Performed by: HOSPITALIST

## 2021-06-16 PROCEDURE — 250N000013 HC RX MED GY IP 250 OP 250 PS 637: Performed by: HOSPITALIST

## 2021-06-16 PROCEDURE — 99239 HOSP IP/OBS DSCHRG MGMT >30: CPT | Performed by: HOSPITALIST

## 2021-06-16 PROCEDURE — 85049 AUTOMATED PLATELET COUNT: CPT | Performed by: HOSPITALIST

## 2021-06-16 PROCEDURE — 80076 HEPATIC FUNCTION PANEL: CPT | Performed by: HOSPITALIST

## 2021-06-16 PROCEDURE — 36415 COLL VENOUS BLD VENIPUNCTURE: CPT | Performed by: HOSPITALIST

## 2021-06-16 RX ORDER — CIPROFLOXACIN 500 MG/1
500 TABLET, FILM COATED ORAL 2 TIMES DAILY
Qty: 14 TABLET | Refills: 0 | Status: SHIPPED | OUTPATIENT
Start: 2021-06-16 | End: 2021-06-23

## 2021-06-16 RX ORDER — CIPROFLOXACIN 250 MG/1
250 TABLET, FILM COATED ORAL ONCE
Status: COMPLETED | OUTPATIENT
Start: 2021-06-16 | End: 2021-06-16

## 2021-06-16 RX ORDER — ACETAMINOPHEN 325 MG/1
650 TABLET ORAL EVERY 4 HOURS PRN
COMMUNITY
Start: 2021-06-16

## 2021-06-16 RX ORDER — LISINOPRIL 10 MG/1
10 TABLET ORAL DAILY
Qty: 60 TABLET | Refills: 1 | Status: SHIPPED | OUTPATIENT
Start: 2021-06-16

## 2021-06-16 RX ORDER — ONDANSETRON 4 MG/1
4 TABLET, ORALLY DISINTEGRATING ORAL EVERY 6 HOURS PRN
Qty: 10 TABLET | Refills: 0 | Status: SHIPPED | OUTPATIENT
Start: 2021-06-16

## 2021-06-16 RX ORDER — AMOXICILLIN 250 MG
1 CAPSULE ORAL ONCE
Status: DISCONTINUED | OUTPATIENT
Start: 2021-06-16 | End: 2021-06-16 | Stop reason: HOSPADM

## 2021-06-16 RX ORDER — POLYETHYLENE GLYCOL 3350 17 G/17G
17 POWDER, FOR SOLUTION ORAL ONCE
Status: DISCONTINUED | OUTPATIENT
Start: 2021-06-16 | End: 2021-06-16 | Stop reason: HOSPADM

## 2021-06-16 RX ORDER — SENNOSIDES 8.6 MG
1-2 CAPSULE ORAL 2 TIMES DAILY PRN
Qty: 30 CAPSULE | Refills: 0 | Status: SHIPPED | OUTPATIENT
Start: 2021-06-16

## 2021-06-16 RX ORDER — OXYCODONE HYDROCHLORIDE 5 MG/1
5-10 TABLET ORAL
Qty: 10 TABLET | Refills: 0 | Status: SHIPPED | OUTPATIENT
Start: 2021-06-16

## 2021-06-16 RX ADMIN — OXYCODONE HYDROCHLORIDE 10 MG: 5 TABLET ORAL at 04:18

## 2021-06-16 RX ADMIN — CIPROFLOXACIN 250 MG: 250 TABLET ORAL at 08:43

## 2021-06-16 RX ADMIN — OMEPRAZOLE 20 MG: 20 CAPSULE, DELAYED RELEASE ORAL at 07:01

## 2021-06-16 RX ADMIN — LISINOPRIL 10 MG: 10 TABLET ORAL at 08:43

## 2021-06-16 RX ADMIN — ACETAMINOPHEN 650 MG: 325 TABLET, FILM COATED ORAL at 06:05

## 2021-06-16 RX ADMIN — ONDANSETRON 4 MG: 4 TABLET, ORALLY DISINTEGRATING ORAL at 04:19

## 2021-06-16 ASSESSMENT — ACTIVITIES OF DAILY LIVING (ADL)
DRESSING/BATHING_DIFFICULTY: NO
CONCENTRATING,_REMEMBERING_OR_MAKING_DECISIONS_DIFFICULTY: NO
FALL_HISTORY_WITHIN_LAST_SIX_MONTHS: NO
DIFFICULTY_COMMUNICATING: NO
TOILETING_ISSUES: NO
DIFFICULTY_EATING/SWALLOWING: NO
WALKING_OR_CLIMBING_STAIRS_DIFFICULTY: NO
WEAR_GLASSES_OR_BLIND: NO
DOING_ERRANDS_INDEPENDENTLY_DIFFICULTY: NO

## 2021-06-16 NOTE — PLAN OF CARE
Patient's After Visit Summary was reviewed with patient. Patient verbalized understanding of After Visit Summary, recommended follow up and was given an opportunity to ask questions.   Discharge medications sent home with patient/family: YES   Discharged with self.     No IV access. All personal belongings returned. Escorted to exit via wheelchair with staff.    INPATIENT OUT TIME: 10:20 AM

## 2021-06-16 NOTE — DISCHARGE SUMMARY
[facility]  Hospitalist Discharge Summary      Date of Admission:  6/14/2021  Date of Discharge:  6/16/2021  Discharging Provider: Sukumar Shirley MD      Discharge Diagnoses   UTI, pyelonephritis with bacteremia and sepsis. Pancytopenia likely due to infection/sepsis    Follow-ups Needed After Discharge   Follow-up Appointments     Follow-up and recommended labs and tests       Follow up with primary care provider, Odalis Hall, within 7 days   for hospital follow- up.  The following labs/tests are recommended:   follow-up on blood pressure and chem 7 to check potassium levels after   starting new blood pressure medication.             Unresulted Labs Ordered in the Past 30 Days of this Admission     Date and Time Order Name Status Description    6/15/2021 0826 Blood culture Preliminary     6/15/2021 0826 Blood culture Preliminary     6/14/2021 0834 Blood culture Preliminary     6/14/2021 0820 Blood culture Preliminary       These results will be followed up by NA    Discharge Disposition   Discharged to home  Condition at discharge: Stable    Hospital Course   Disha Watson is a 36 year old female with fibromyalgia admitted on 6/14/2021 with UTI and pyelonephritis. Patient states her symptoms started 3 weeks ago. She took Azo and felt like her symptoms improved.  She states on Saturday she started having malodorous and discolored urine again.  She also had body aches including her neck and a migraine.  She then started having bilateral flank pain.  She was also reporting fevers up to 103.  She states she has had urinary tract infections in the past.  she denies any chest pain, shortness of breath, abdominal pain she did vomit once on Saturday.  No diarrhea.  No URI symptoms.  Her pain has improved after pain medications in the emergency room.  It is now 3 out of 10.  Patient was admitted to the hospital.  She was started on IV ceftriaxone.  Her blood cultures grew E. coli.  Her  urine cultures have returned and are growing E. coli that is pansensitive.  Patient has slowly improved clinically.  She was seen by infectious disease who recommended a total of a 10-day course of antibiotics.  She did end up having a CAT scan which confirmed the diagnosis of a pyelonephritis.  She will discharge home today.  She will discharged with 7 more days of ciprofloxacin, oxycodone for the pain, Zofran for the nausea, and bowel meds.  She will follow up with her primary care provider.    Consultations This Hospital Stay   INFECTIOUS DISEASES IP CONSULT    Code Status   Full Code    Time Spent on this Encounter   I, Sukumar Shirley MD, personally saw the patient today and spent greater than 30 minutes discharging this patient.       Sukumar Shirley MD  [facility] OBSERVATION DEPT  [address] [place] [address]  Phone: [phone]  ______________________________________________________________________    Physical Exam   Vital Signs: Temp: 99.7  F (37.6  C) Temp src: Oral BP: 130/83 Pulse: 78   Resp: 18 SpO2: 97 % O2 Device: None (Room air)    Weight: 251 lbs 8 oz  Constitutional: awake, alert, cooperative, no apparent distress, and appears stated age  Eyes: Lids and lashes normal, pupils equal, round and reactive to light, extra ocular muscles intact, sclera clear, conjunctiva normal  ENT: Normocephalic, without obvious abnormality, atraumatic, sinuses nontender on palpation, external ears without lesions, oral pharynx with moist mucous membranes, tonsils without erythema or exudates, gums normal and good dentition.  Respiratory: No increased work of breathing, good air exchange, clear to auscultation bilaterally, no crackles or wheezing  Cardiovascular: Normal apical impulse, regular rate and rhythm, normal S1 and S2, no S3 or S4, and no murmur noted  GI: No scars, normal bowel sounds, soft, non-distended, non-tender, no masses palpated, no hepatosplenomegally  Skin: no  bruising or bleeding  Musculoskeletal: no lower extremity pitting edema present       Primary Care Physician   Odalis Hall    Discharge Orders      Reason for your hospital stay    Acute UTI with pyelonephritis and bacteremia. Undiagnosed hypertension     Follow-up and recommended labs and tests     Follow up with primary care provider, Odalis Hall, within 7 days for hospital follow- up.  The following labs/tests are recommended: follow-up on blood pressure and chem 7 to check potassium levels after starting new blood pressure medication.     Activity    Your activity upon discharge: activity as tolerated     Diet    Follow this diet upon discharge: Orders Placed This Encounter      Regular Diet Adult       Significant Results and Procedures   Most Recent 3 CBC's:  Recent Labs   Lab Test 06/16/21  0523 06/15/21  0552 06/14/21 0752 04/22/13  1650   WBC  --  3.7* 5.4 11.2*   HGB  --  10.8* 12.8 13.4   MCV  --  103* 102* 87   PLT 96* 103* 126* 310     Most Recent 3 BMP's:  Recent Labs   Lab Test 06/16/21  0523 06/15/21  0552 06/14/21 0752 04/22/13  1650   NA  --  135 135 141   POTASSIUM  --  3.5 3.0* 3.9   CHLORIDE  --  102 102 101   CO2  --  28 29 25   BUN  --  3* 5* 10   CR 0.49* 0.55 0.58 0.57   ANIONGAP  --  5 4 14.7   NICK  --  7.7* 8.3* 9.1   GLC  --  97 135* 91     Most Recent 2 LFT's:  Recent Labs   Lab Test 06/16/21 0523 06/14/21 0752   AST 94* 78*   ALT 68* 56*   ALKPHOS 114 138   BILITOTAL 1.2 1.2     Most Recent Urinalysis:  Recent Labs   Lab Test 06/14/21 0757   COLOR Dark Yellow   APPEARANCE Slightly Cloudy   URINEGLC Negative   URINEBILI Negative   URINEKETONE Negative   SG 1.020   UBLD Small*   URINEPH 6.0   PROTEIN 50*   NITRITE Negative   LEUKEST Large*   RBCU 18*   WBCU >182*   ,   Results for orders placed or performed during the hospital encounter of 06/14/21   CT Abdomen Pelvis w/o Contrast    Narrative    CT ABDOMEN AND PELVIS WITHOUT CONTRAST 6/15/2021 3:56 PM    CLINICAL  HISTORY: Flank pain, kidney stone suspected.    TECHNIQUE: CT scan of the abdomen and pelvis was performed without IV  contrast. Multiplanar reformats were obtained. Dose reduction  techniques were used.  CONTRAST: None.    COMPARISON: 4/22/2013    FINDINGS:   LOWER CHEST: Normal.    HEPATOBILIARY: Hepatic steatosis. Cholecystectomy.    PANCREAS: Normal.    SPLEEN: Normal.    ADRENAL GLANDS: Normal.    KIDNEYS/BLADDER: New moderate left perinephric stranding. No  hydronephrosis or urinary tract calculi. No fluid collections.    BOWEL: Normal.    LYMPH NODES: Normal.    VASCULATURE: Unremarkable.    PELVIC ORGANS: Normal.    OTHER: None.    MUSCULOSKELETAL: Normal.      Impression    IMPRESSION:   1.  Left nephric and perinephric inflammation may indicate  pyelonephritis.  2.  No hydronephrosis or renal calculi.    TIFFANIE CURTIS MD       Discharge Medications   Current Discharge Medication List      START taking these medications    Details   acetaminophen (TYLENOL) 325 MG tablet Take 2 tablets (650 mg) by mouth every 4 hours as needed for mild pain  Qty:      Associated Diagnoses: Pyelonephritis, acute      ciprofloxacin (CIPRO) 500 MG tablet Take 1 tablet (500 mg) by mouth 2 times daily for 7 days  Qty: 14 tablet, Refills: 0    Associated Diagnoses: Pyelonephritis, acute      lisinopril (ZESTRIL) 10 MG tablet Take 1 tablet (10 mg) by mouth daily  Qty: 60 tablet, Refills: 1    Associated Diagnoses: Benign essential hypertension      ondansetron (ZOFRAN-ODT) 4 MG ODT tab Take 1 tablet (4 mg) by mouth every 6 hours as needed for nausea or vomiting  Qty: 10 tablet, Refills: 0    Associated Diagnoses: Nausea      oxyCODONE (ROXICODONE) 5 MG tablet Take 1-2 tablets (5-10 mg) by mouth every 3 hours as needed for moderate to severe pain  Qty: 10 tablet, Refills: 0    Associated Diagnoses: Pyelonephritis, acute      Sennosides (SENNA) 8.6 MG CAPS Take 1-2 tablets by mouth 2 times daily as needed (constipation)  Qty: 30  capsule, Refills: 0    Associated Diagnoses: Constipation, unspecified constipation type         CONTINUE these medications which have NOT CHANGED    Details   ibuprofen (ADVIL/MOTRIN) 200 MG tablet Take 800 mg by mouth every 8 hours as needed for mild pain      omeprazole (PRILOSEC OTC) 20 MG EC tablet Take 20 mg by mouth daily           Allergies   Allergies   Allergen Reactions     Morphine [Fumaric Acid] Itching and Swelling

## 2021-06-16 NOTE — PLAN OF CARE
"Inpatient Progress Note:    BP (!) 153/101 (BP Location: Left arm)   Pulse 78   Temp 98.5  F (36.9  C) (Oral)   Resp 18   Ht 1.6 m (5' 3\")   Wt 114.1 kg (251 lb 8 oz)   LMP 05/23/2021   SpO2 91%   BMI 44.55 kg/m      Orientation: A&Ox4.   Pain status: Rates groin/flank/back pain 4/10. Pain managed w/ PRN oxycodone and Tylenol.  Activity: SBA  Peripheral edema: None   Resp: WDL  Cardiac: WDL  GI: BS active x4. Intermittent nausea PRN Zofran available, patient declines for now. LUQ abdomen is tender with palpation.  : WDL, except urinary urgency at times.   Skin: Scabs/scarring to face. Scattered bruising present throughout body.   LDA: No IV access. MD is aware.   Infusions: IV Fortaz Q24H. SL between abx.   Pertinent Labs: COVID (-), BUN 3, WBC 3.7 (6/15), Plt 96, follow B/Cs  Diet: Tolerating regular diet. Intermittent nausea.   Consults: Infectious Disease, see notes  Discharge Plan: Continue to follow cultures, ID following. Plan to discharge to home today with transition to po abx.     Will continue to monitor and provide cares.     "

## 2021-06-16 NOTE — PLAN OF CARE
"[id number]    Inpatient Progress Note:    /83 (BP Location: Right arm)   Pulse 78   Temp 99.4  F (37.4  C) (Oral)   Resp 18   Ht 1.6 m (5' 3\")   Wt 114.1 kg (251 lb 8 oz)   LMP 05/23/2021   SpO2 98%   BMI 44.55 kg/m       Orientation: alert and oriented x4  Pain status: PRN oxycodone for groin/flank/back pain and PRN tylenol for HA  Activity: up SBA  Peripheral edema: none  Resp: WDL  Cardiac: WDL  GI: BS active in all quadrants, intermittent nausea - PRN zofran, abdomen tender with palpation  : patient reports urinary urgency at times   Skin: scabs/scarring to face  LDA: no IV access - accidental removal with shower yesterday, requested to leave IV out until next abx dose, MD mcghee'ed  Infusions: IV rocephin  Pertinent Labs: COVID (-), BUN 3, WBC 3.7, Plt 96, follow BCs  Diet: regular  Consults: Infectious Disease  Discharge Plan: follow cultures, ID following, continue IV abx, T max 101.2 F - PRN tylenol administered, PRN atarax for itching    Will continue to monitor and provide cares.     Neela Whyte, RN         "

## 2021-06-17 LAB
BACTERIA SPEC CULT: ABNORMAL
SPECIMEN SOURCE: ABNORMAL

## 2021-06-20 LAB
BACTERIA SPEC CULT: NO GROWTH
SPECIMEN SOURCE: NORMAL

## 2021-06-21 LAB
BACTERIA SPEC CULT: NO GROWTH
BACTERIA SPEC CULT: NO GROWTH
SPECIMEN SOURCE: NORMAL
SPECIMEN SOURCE: NORMAL

## 2021-09-18 ENCOUNTER — HEALTH MAINTENANCE LETTER (OUTPATIENT)
Age: 36
End: 2021-09-18

## 2022-01-08 ENCOUNTER — HEALTH MAINTENANCE LETTER (OUTPATIENT)
Age: 37
End: 2022-01-08

## 2022-11-20 ENCOUNTER — HEALTH MAINTENANCE LETTER (OUTPATIENT)
Age: 37
End: 2022-11-20

## 2023-04-15 ENCOUNTER — HEALTH MAINTENANCE LETTER (OUTPATIENT)
Age: 38
End: 2023-04-15

## 2023-05-06 ENCOUNTER — NURSE TRIAGE (OUTPATIENT)
Dept: NURSING | Facility: CLINIC | Age: 38
End: 2023-05-06
Payer: COMMERCIAL

## 2023-05-07 ENCOUNTER — HOSPITAL ENCOUNTER (EMERGENCY)
Facility: CLINIC | Age: 38
Discharge: HOME | End: 2023-05-07
Attending: EMERGENCY MEDICINE | Admitting: EMERGENCY MEDICINE
Payer: COMMERCIAL

## 2023-05-07 ENCOUNTER — APPOINTMENT (OUTPATIENT)
Dept: CT IMAGING | Facility: CLINIC | Age: 38
End: 2023-05-07
Payer: COMMERCIAL

## 2023-05-07 VITALS
HEART RATE: 82 BPM | OXYGEN SATURATION: 93 % | RESPIRATION RATE: 18 BRPM | SYSTOLIC BLOOD PRESSURE: 181 MMHG | TEMPERATURE: 97.5 F | DIASTOLIC BLOOD PRESSURE: 104 MMHG

## 2023-05-07 DIAGNOSIS — R22.0 FACIAL SWELLING: ICD-10-CM

## 2023-05-07 DIAGNOSIS — L03.211 CELLULITIS OF FACE: ICD-10-CM

## 2023-05-07 DIAGNOSIS — I10 HYPERTENSION, UNSPECIFIED TYPE: ICD-10-CM

## 2023-05-07 LAB
ANION GAP SERPL CALCULATED.3IONS-SCNC: 8 MMOL/L (ref 7–15)
BASOPHILS # BLD AUTO: 0.1 10E3/UL (ref 0–0.2)
BASOPHILS NFR BLD AUTO: 1 %
BUN SERPL-MCNC: 4.9 MG/DL (ref 6–20)
CALCIUM SERPL-MCNC: 8.1 MG/DL (ref 8.6–10)
CHLORIDE SERPL-SCNC: 102 MMOL/L (ref 98–107)
CREAT SERPL-MCNC: 0.59 MG/DL (ref 0.51–0.95)
DEPRECATED HCO3 PLAS-SCNC: 27 MMOL/L (ref 22–29)
EOSINOPHIL # BLD AUTO: 0.1 10E3/UL (ref 0–0.7)
EOSINOPHIL NFR BLD AUTO: 1 %
ERYTHROCYTE [DISTWIDTH] IN BLOOD BY AUTOMATED COUNT: 19.4 % (ref 10–15)
GFR SERPL CREATININE-BSD FRML MDRD: >90 ML/MIN/1.73M2
GLUCOSE SERPL-MCNC: 93 MG/DL (ref 70–99)
HCT VFR BLD AUTO: 38.7 % (ref 35–47)
HGB BLD-MCNC: 11.8 G/DL (ref 11.7–15.7)
IMM GRANULOCYTES # BLD: 0 10E3/UL
IMM GRANULOCYTES NFR BLD: 0 %
LYMPHOCYTES # BLD AUTO: 2.1 10E3/UL (ref 0.8–5.3)
LYMPHOCYTES NFR BLD AUTO: 31 %
MCH RBC QN AUTO: 25.7 PG (ref 26.5–33)
MCHC RBC AUTO-ENTMCNC: 30.5 G/DL (ref 31.5–36.5)
MCV RBC AUTO: 84 FL (ref 78–100)
MONOCYTES # BLD AUTO: 0.5 10E3/UL (ref 0–1.3)
MONOCYTES NFR BLD AUTO: 7 %
NEUTROPHILS # BLD AUTO: 4.2 10E3/UL (ref 1.6–8.3)
NEUTROPHILS NFR BLD AUTO: 60 %
NRBC # BLD AUTO: 0 10E3/UL
NRBC BLD AUTO-RTO: 0 /100
PLATELET # BLD AUTO: 229 10E3/UL (ref 150–450)
POTASSIUM SERPL-SCNC: 4 MMOL/L (ref 3.4–5.3)
RBC # BLD AUTO: 4.59 10E6/UL (ref 3.8–5.2)
SODIUM SERPL-SCNC: 137 MMOL/L (ref 136–145)
WBC # BLD AUTO: 6.9 10E3/UL (ref 4–11)

## 2023-05-07 PROCEDURE — 250N000011 HC RX IP 250 OP 636: Performed by: EMERGENCY MEDICINE

## 2023-05-07 PROCEDURE — 96365 THER/PROPH/DIAG IV INF INIT: CPT

## 2023-05-07 PROCEDURE — 250N000009 HC RX 250: Performed by: EMERGENCY MEDICINE

## 2023-05-07 PROCEDURE — 99285 EMERGENCY DEPT VISIT HI MDM: CPT | Mod: 25

## 2023-05-07 PROCEDURE — 96375 TX/PRO/DX INJ NEW DRUG ADDON: CPT | Mod: 59

## 2023-05-07 PROCEDURE — 70487 CT MAXILLOFACIAL W/DYE: CPT

## 2023-05-07 PROCEDURE — 85025 COMPLETE CBC W/AUTO DIFF WBC: CPT

## 2023-05-07 PROCEDURE — 36415 COLL VENOUS BLD VENIPUNCTURE: CPT

## 2023-05-07 PROCEDURE — 80048 BASIC METABOLIC PNL TOTAL CA: CPT

## 2023-05-07 PROCEDURE — 250N000011 HC RX IP 250 OP 636

## 2023-05-07 RX ORDER — IOPAMIDOL 755 MG/ML
500 INJECTION, SOLUTION INTRAVASCULAR ONCE
Status: COMPLETED | OUTPATIENT
Start: 2023-05-07 | End: 2023-05-07

## 2023-05-07 RX ORDER — DEXAMETHASONE SODIUM PHOSPHATE 10 MG/ML
6 INJECTION, SOLUTION INTRAMUSCULAR; INTRAVENOUS ONCE
Status: COMPLETED | OUTPATIENT
Start: 2023-05-07 | End: 2023-05-07

## 2023-05-07 RX ORDER — AMPICILLIN AND SULBACTAM 2; 1 G/1; G/1
3 INJECTION, POWDER, FOR SOLUTION INTRAMUSCULAR; INTRAVENOUS ONCE
Status: COMPLETED | OUTPATIENT
Start: 2023-05-07 | End: 2023-05-07

## 2023-05-07 RX ORDER — METRONIDAZOLE 500 MG/100ML
500 INJECTION, SOLUTION INTRAVENOUS ONCE
Status: DISCONTINUED | OUTPATIENT
Start: 2023-05-07 | End: 2023-05-07

## 2023-05-07 RX ORDER — IBUPROFEN 800 MG/1
800 TABLET, FILM COATED ORAL EVERY 8 HOURS PRN
Qty: 12 TABLET | Refills: 0 | Status: SHIPPED | OUTPATIENT
Start: 2023-05-07 | End: 2023-05-15

## 2023-05-07 RX ORDER — HYDROMORPHONE HYDROCHLORIDE 1 MG/ML
0.5 INJECTION, SOLUTION INTRAMUSCULAR; INTRAVENOUS; SUBCUTANEOUS ONCE
Status: COMPLETED | OUTPATIENT
Start: 2023-05-07 | End: 2023-05-07

## 2023-05-07 RX ADMIN — DEXAMETHASONE SODIUM PHOSPHATE 6 MG: 10 INJECTION, SOLUTION INTRAMUSCULAR; INTRAVENOUS at 18:06

## 2023-05-07 RX ADMIN — SODIUM CHLORIDE 65 ML: 9 INJECTION, SOLUTION INTRAVENOUS at 16:26

## 2023-05-07 RX ADMIN — HYDROMORPHONE HYDROCHLORIDE 0.5 MG: 1 INJECTION, SOLUTION INTRAMUSCULAR; INTRAVENOUS; SUBCUTANEOUS at 15:58

## 2023-05-07 RX ADMIN — IOPAMIDOL 75 ML: 755 INJECTION, SOLUTION INTRAVENOUS at 16:26

## 2023-05-07 RX ADMIN — AMPICILLIN SODIUM AND SULBACTAM SODIUM 3 G: 2; 1 INJECTION, POWDER, FOR SOLUTION INTRAMUSCULAR; INTRAVENOUS at 17:04

## 2023-05-07 ASSESSMENT — ACTIVITIES OF DAILY LIVING (ADL)
ADLS_ACUITY_SCORE: 35
ADLS_ACUITY_SCORE: 33
ADLS_ACUITY_SCORE: 35

## 2023-05-07 NOTE — ED PROVIDER NOTES
History     Chief Complaint:  Dental Pain and Facial Swelling       HPI   Disha Watson is a 38 year old female presenting today with right facial pain and swelling.  3 weeks ago she was eating popcorn, and cracked one of her right upper molars.  She did not have this repaired.  Approximately 3 days ago she had onset of right-sided facial pain and pain surrounding the cracked tooth.  2 days ago, she developed right-sided facial swelling which acutely worsened yesterday afternoon.  The pain became unbearable at that point.  She also reports associated right eye vision blurriness.  No known fevers at home.  She has been able to swallow.  Does not radiate into the neck, ear, or chest.    Independent Historian: She is an independent historian    Review of External Notes: Telephone nurse triage note from yesterday.    ROS:  Noted in HPI    Allergies:  Tramadol  Duloxetine  Morphine [Fumaric Acid]  Prednisolone  Prednisone  Pregabalin     Medications:    amoxicillin-clavulanate (AUGMENTIN) 875-125 MG tablet  ibuprofen (ADVIL/MOTRIN) 800 MG tablet  acetaminophen (TYLENOL) 325 MG tablet  ibuprofen (ADVIL/MOTRIN) 200 MG tablet  lisinopril (ZESTRIL) 10 MG tablet  omeprazole (PRILOSEC OTC) 20 MG EC tablet  ondansetron (ZOFRAN-ODT) 4 MG ODT tab  oxyCODONE (ROXICODONE) 5 MG tablet  Sennosides (SENNA) 8.6 MG CAPS      Past Medical History:    Past Medical History:   Diagnosis Date     Bipolar 2 disorder      Mixed hyperlipidemia        Past Surgical History:    Past Surgical History:   Procedure Laterality Date     BARIATRIC SURGERY       LAPAROSCOPIC CHOLECYSTECTOMY       Primary Low Transverse  Section  2001     REPAIR HERNIA WITH MESH          Family History:    family history includes C.A.D. in her paternal aunt and paternal grandmother; Diabetes in her maternal grandmother; Lupus in her paternal aunt and another family member; Psychotic Disorder in her mother.    Social History:  She has a  daughter.  PCP: Odalis Hall     Physical Exam     Patient Vitals for the past 24 hrs:   BP Temp Temp src Pulse Resp SpO2   05/07/23 1337 (!) 181/104 97.5  F (36.4  C) Temporal 82 18 100 %        Physical Exam  BP (!) 181/104   Pulse 82   Temp 97.5  F (36.4  C) (Temporal)   Resp 18   SpO2 100%    General: Patient is tearful and appears anxious.  Head: Atraumatic. Normocephalic.  EENT: PERRL. EOMI. Vision intact. Moist mucus membranes.  Able to open and close jaw. Tolerating her own secretions. Speaking in full sentences.  Voice normal.  Tooth #3 is cracked.  Right facial swelling with overlying erythema.  No swelling to the floor of the mouth.  CV: Regular rate and rhythm. No appreciable murmurs, rubs, or gallops.   Respiratory: Breathing comfortably on room air. Lungs clear to auscultation bilaterally without wheezes, rhonchi, or rales.  GI: Soft, non-distended. Non-tender abdomen. No rebound, rigidity, or guarding.   Skin: Warm and dry. No rashes.  Neuro: Awake, alert, and conversant. No focal neurologic deficits.   Psych: Appropriate mood and affect.    Emergency Department Course     Imaging:  CT Facial Bones with Contrast   Final Result   IMPRESSION:    1.  Advanced dental disease with a number of cavities.   2.  Tooth #3 in the right maxillary alveolus demonstrates periapical lucency and dehiscence of the overlying maxillary sinus floor where there is moderate maxillary sinus mucosal thickening suggesting a component of odontogenic sinusitis. There is also    inflammatory change along the lateral right maxillary alveolus as well as the more superficial soft tissues presumably reflecting inflammation/infection related to dental disease and cellulitis.   3.  No fluid collection.        Report per radiology    Laboratory:  Labs Ordered and Resulted from Time of ED Arrival to Time of ED Departure   CBC WITH PLATELETS AND DIFFERENTIAL - Abnormal       Result Value    WBC Count 6.9      RBC Count 4.59       Hemoglobin 11.8      Hematocrit 38.7      MCV 84      MCH 25.7 (*)     MCHC 30.5 (*)     RDW 19.4 (*)     Platelet Count 229      % Neutrophils 60      % Lymphocytes 31      % Monocytes 7      % Eosinophils 1      % Basophils 1      % Immature Granulocytes 0      NRBCs per 100 WBC 0      Absolute Neutrophils 4.2      Absolute Lymphocytes 2.1      Absolute Monocytes 0.5      Absolute Eosinophils 0.1      Absolute Basophils 0.1      Absolute Immature Granulocytes 0.0      Absolute NRBCs 0.0     BASIC METABOLIC PANEL        Procedures   None.    Emergency Department Course & Assessments:  Interventions:  Medications   HYDROmorphone (PF) (DILAUDID) injection 0.5 mg (0.5 mg Intravenous $Given 5/7/23 1554)   ampicillin-sulbactam (UNASYN) 3 g vial to attach to  mL bag (0 g Intravenous Stopped 5/7/23 1806)   CT Scan Flush (65 mLs Intravenous $Given 5/7/23 1626)   iopamidol (ISOVUE-370) solution 500 mL (75 mLs Intravenous $Given 5/7/23 1626)   dexamethasone PF (DECADRON) injection 6 mg (6 mg Intravenous $Given 5/7/23 1806)     Consultations/Discussion of Management or Tests:  Patient was seen in conjunction with attending physician Dr. White.    Social Determinants of Health affecting care:  None identified    Assessments:  Initially evaluated the patient at 1:30 p.m.    ED Course as of 05/07/23 1825   Sun May 07, 2023   1710 I reevaluated the patient.  She is feeling better following pain medication.  She was updated on the preliminary results of the CT.     Disposition:  Signed out to Dr. Patel     Impression & Plan      Medical Decision Making:  This is a 38-year-old female presenting with the above concerns.  Differential diagnosis included dental abscess, dental infection, pulpitis, sinusitis, Manoj's angina.  On exam, there is no edema to the floor of the mouth, and the patient is able to tolerate her own secretions, so I do not suspect Manoj's angina at this time.  She does however have significant  tenderness and swelling to the right face with an obvious cracked tooth #3.  She was given a dose of IV antibiotics and sent for a CT. CT as above.  She will be signed out to Dr. Patel pending CBC and BMP results.     If the results return without any significant abnormalities, the patient will be discharged home with pain medications and oral antibiotics.  We discussed the importance of following up with dentist as soon as possible.  She should return for increased swelling, inability to tolerate her own secretions and high fever.      Diagnosis:    ICD-10-CM    1. Facial swelling  R22.0       2. Cellulitis of face  L03.211       3. Hypertension, unspecified type  I10          Discharge Medications:  New Prescriptions    AMOXICILLIN-CLAVULANATE (AUGMENTIN) 875-125 MG TABLET    Take 1 tablet by mouth 2 times daily    IBUPROFEN (ADVIL/MOTRIN) 800 MG TABLET    Take 1 tablet (800 mg) by mouth every 8 hours as needed for moderate pain      GEORGIA Vasquez Amanda, PA-C  05/07/23 182

## 2023-05-07 NOTE — ED TRIAGE NOTES
Pt arrives with c/o right sided dental pain and facial swelling. Pt reports swelling has gotten worse. Pt is concerned about a dental abscess. Last ibuprofen taken at 0730.     Triage Assessment     Row Name 05/07/23 3094       Triage Assessment (Adult)    Airway WDL WDL       Respiratory WDL    Respiratory WDL WDL       Cardiac WDL    Cardiac WDL WDL       Peripheral/Neurovascular WDL    Peripheral Neurovascular WDL WDL       Cognitive/Neuro/Behavioral WDL    Cognitive/Neuro/Behavioral WDL WDL

## 2023-05-07 NOTE — DISCHARGE INSTRUCTIONS
Discharge Instructions  Cellulitis    Cellulitis is an infection of the skin that occurs when bacteria enter the skin.   Symptoms are generally redness, swelling, warmth and pain.  Your infection appeared to be appropriate to treat at home with antibiotics.  However, sometimes your infection may be worse than it seemed at first, or may worsen with time. If you have new or worse symptoms, you may need to be seen again in the Emergency Department or by your primary doctor.    Return to the Emergency Department if:  The redness, pain, or swelling gets a lot worse.  If the red area was marked, return if it is red beyond the marked area.  You are unable to get your antibiotics, or are vomiting them up or you can t take them.  You are feeling more ill, weak or lightheaded.  You start to run a new fever (temperature >101).  Anything else about the infection worries or concerns you.  Treatment:  Start your antibiotics right away, and take them as prescribed. Be sure to finish the whole prescription, even if you are better.  Apply a heating pad, warm packs, or warm water soaks to the infected area for 15 minutes at a time, at least 3 times a day. Do not use a heating pad on your feet or legs if you have diabetes. Do not sleep with a heating pad on, since this can cause burns or skin injury.  Rest your injured area for at least 1-2 days. After that you may start using your extremity again as long as there is not too much pain.   Raise the injured area above the level of your heart as much as possible in the first 1-2 days.  Tylenol  (acetaminophen), Motrin  (ibuprofen), or Advil  (ibuprofen) may help may help reduce pain and fever and may help you feel more comfortable. Be sure to read and follow the package directions, and ask your doctor if you have questions.    Follow-up with your doctor:  Re-check in clinic within 2-3 days.  Probiotics: If you have been given an antibiotic, you may want to also take a probiotic pill or  "eat yogurt with live cultures. Probiotics have \"good bacteria\" to help your intestines stay healthy. Studies have shown that probiotics help prevent diarrhea and other intestine problems (including C. diff infection) when you take antibiotics. You can buy these without a prescription in the pharmacy section of the store.     If you were given a prescription for medicine here today, be sure to read all of the information (including the package insert) that comes with your prescription.  This will include important information about the medicine, its side effects, and any warnings that you need to know about.  The pharmacist who fills the prescription can provide more information and answer questions you may have about the medicine.  If you have questions or concerns that the pharmacist cannot address, please call or return to the Emergency Department.     Opioid Medication Information    Pain medications are among the most commonly prescribed medicines, so we are including this information for all our patients. If you did not receive pain medication or get a prescription for pain medicine, you can ignore it.     You may have been given a prescription for an opioid (narcotic) pain medicine and/or have received a pain medicine while here in the Emergency Department. These medicines can make you drowsy or impaired. You must not drive, operate dangerous equipment, or engage in any other dangerous activities while taking these medications. If you drive while taking these medications, you could be arrested for DUI, or driving under the influence. Do not drink any alcohol while you are taking these medications.     Opioid pain medications can cause addiction. If you have a history of chemical dependency of any type, you are at a higher risk of becoming addicted to pain medications.  Only take these prescribed medications to treat your pain when all other options have been tried. Take it for as short a time and as few doses " as possible. Store your pain pills in a secure place, as they are frequently stolen and provide a dangerous opportunity for children or visitors in your house to start abusing these powerful medications. We will not replace any lost or stolen medicine.  As soon as your pain is better, you should flush all your remaining medication.     Many prescription pain medications contain Tylenol  (acetaminophen), including Vicodin , Tylenol #3 , Norco , Lortab , and Percocet .  You should not take any extra pills of Tylenol  if you are using these prescription medications or you can get very sick.  Do not ever take more than 3000 mg of acetaminophen in any 24 hour period.    All opioids tend to cause constipation. Drink plenty of water and eat foods that have a lot of fiber, such as fruits, vegetables, prune juice, apple juice and high fiber cereal.  Take a laxative if you don t move your bowels at least every other day. Miralax , Milk of Magnesia, Colace , or Senna  can be used to keep you regular.      Remember that you can always come back to the Emergency Department if you are not able to see your regular doctor in the amount of time listed above, if you get any new symptoms, or if there is anything that worries you.

## 2023-05-07 NOTE — ED PROVIDER NOTES
ED ATTENDING PHYSICIAN NOTE:   I evaluated this patient in conjunction with Megha Cherry PA-C  I have participated in the care of the patient and personally performed key elements of the history, exam, and medical decision making.      HPI:   Disha Watson is a 38 year old female presenting today with right facial pain and swelling. 3 weeks ago she was eating popcorn, and cracked one of her right upper molars. She did not have this repaired. Approximately 3 days ago she had onset of right-sided facial pain and pain surrounding the cracked tooth. 2 days ago, she developed right-sided facial swelling which acutely worsened yesterday afternoon. The pain became unbearable at that point. She also reports associated right eye vision blurriness. No known fevers at home. She has been able to swallow. Does not radiate into the neck, ear, or chest.    Independent Historian:   None - Patient Only    Review of External Notes: Previous admission notes reviewed     EXAM:   General:  No respiratory distress    HEENT: The right cheek was swollen there is tenderness along the gumline no palpable abscess.  No elevation of the floor the mouth no woody induration of the neck.  Cervical lymphadenopathy is present    Cardiovascular: Good cap refill.    Respiratory: Breathing non labored.     Musculoskeletal: No tenderness. No bony deformity.     Skin: No rashes or petechiae     Neurologic: non focal      Psychiatric: Appropriate       Independent Interpretation (X-rays, CTs, rhythm strip):  CT was reviewed reviewed by myself I do agree with sinusitis and no output abscess was visible         MEDICAL DECISION MAKING/ASSESSMENT AND PLAN:    There was concerned with the patient's cheek being swollen and a likely dental source that this was an abscess thankfully no abscess was found I think the sinusitis is likely reactive there are no other neurologic symptoms to go along with this she has no compromise of her airway the patient was  treated with steroids antibiotic and was discharged to follow-up on antibiotics with dentistry as well as her primary care doctor.     DIAGNOSIS:     ICD-10-CM    1. Facial swelling  R22.0       2. Cellulitis of face  L03.211       3. Hypertension, unspecified type  I10           DISPOSITION:   The patient was discharged to home.       Scribe Disclosure:  Lucina PEREZ, am serving as a scribe at 3:09 PM on 5/7/2023 to document services personally performed by Steven White MD based on my observations and the provider's statements to me.     5/7/2023  Steven White MD Farnan, Christopher M, MD  05/07/23 1814

## 2023-05-07 NOTE — TELEPHONE ENCOUNTER
"[facility] [place] is closed  Broken tooth issues. I believe it is an infection or abcess on the upper right side, causing the right side of her face to become red, hot and swelling. It is also imparing her vision: her right eye is blurry. I can't get a ride until tomorrow, and I don't trust my vision to drive.   Pain currently=\"6\". She is taking Ibuprofen every 4 hrs 800mg and says it is not doing anything for the swelling, but it relieves the pain. (Advised not to take this much Ibuprofen due to GI risk and kidney damage)  DX w Fibromyalgia and foot fracture. RX Ibuprofen had been ordered every 6-8 hrs. Has leftover pain medication for foot fracture.     Triaged to a disposition of See HCP or PCP triage within 4 hrs. [facility] closed. Recommended she go to ER [facility], but patient states no transportation. Advised to call oncall PCP and discuss. Also discussed option of 911/ambulance transport if needed.     Sammi Kearney RN Triage Nurse Advisor 8:30 PM 5/6/2023    Reason for Disposition    [1] SEVERE pain (e.g., excruciating, unable to do any normal activities) AND [2] not improved 2 hours after pain medicine    Face is very swollen    Additional Information    Negative: Shock suspected (e.g., cold/pale/clammy skin, too weak to stand, low BP, rapid pulse)    Negative: [1] Similar pain previously AND [2] it was from \"heart attack\"    Negative: [1] Similar pain previously AND [2] it was from \"angina\" AND [3] not relieved by nitroglycerin    Negative: Sounds like a life-threatening emergency to the triager    Negative: Chest pain    Negative: Toothache followed tooth injury    Negative: Toothache or mouth pain after tooth extraction    Negative: Canker sore (i.e., aphthous ulcer)    Negative: Tongue is very swollen and tender    Negative: [1] Face is swollen AND [2] fever    Negative: Patient sounds very sick or weak to the triager    Protocols used: TOOTHACHE-A-AH      "

## 2023-05-07 NOTE — Clinical Note
Disha Watson was seen and treated in our emergency department on 5/7/2023.  She may return to work on 05/09/2023.       If you have any questions or concerns, please don't hesitate to call.      Megha Cherry PA-C

## 2024-04-07 ENCOUNTER — HEALTH MAINTENANCE LETTER (OUTPATIENT)
Age: 39
End: 2024-04-07

## 2024-06-16 ENCOUNTER — HEALTH MAINTENANCE LETTER (OUTPATIENT)
Age: 39
End: 2024-06-16

## 2025-01-25 ENCOUNTER — HOSPITAL ENCOUNTER (EMERGENCY)
Facility: CLINIC | Age: 40
Discharge: HOME | End: 2025-01-25
Attending: EMERGENCY MEDICINE | Admitting: EMERGENCY MEDICINE
Payer: COMMERCIAL

## 2025-01-25 VITALS
TEMPERATURE: 97 F | WEIGHT: 197.31 LBS | RESPIRATION RATE: 20 BRPM | HEIGHT: 63 IN | OXYGEN SATURATION: 97 % | HEART RATE: 71 BPM | SYSTOLIC BLOOD PRESSURE: 151 MMHG | DIASTOLIC BLOOD PRESSURE: 101 MMHG | BODY MASS INDEX: 34.96 KG/M2

## 2025-01-25 DIAGNOSIS — R51.9 FACIAL PAIN: ICD-10-CM

## 2025-01-25 PROCEDURE — 99284 EMERGENCY DEPT VISIT MOD MDM: CPT

## 2025-01-25 RX ORDER — CLINDAMYCIN HYDROCHLORIDE 300 MG/1
300 CAPSULE ORAL 3 TIMES DAILY
Qty: 30 CAPSULE | Refills: 0 | Status: SHIPPED | OUTPATIENT
Start: 2025-01-25 | End: 2025-02-04

## 2025-01-25 RX ORDER — IBUPROFEN 600 MG/1
600 TABLET, FILM COATED ORAL EVERY 6 HOURS PRN
Qty: 30 TABLET | Refills: 1 | Status: SHIPPED | OUTPATIENT
Start: 2025-01-25

## 2025-01-25 RX ORDER — HYDROCODONE BITARTRATE AND ACETAMINOPHEN 5; 325 MG/1; MG/1
1 TABLET ORAL EVERY 6 HOURS PRN
Qty: 10 TABLET | Refills: 0 | Status: SHIPPED | OUTPATIENT
Start: 2025-01-25

## 2025-01-25 RX ORDER — BUPIVACAINE HYDROCHLORIDE AND EPINEPHRINE 5; 5 MG/ML; UG/ML
1.8 INJECTION, SOLUTION PERINEURAL ONCE
Status: DISCONTINUED | OUTPATIENT
Start: 2025-01-25 | End: 2025-01-25 | Stop reason: HOSPADM

## 2025-01-25 ASSESSMENT — COLUMBIA-SUICIDE SEVERITY RATING SCALE - C-SSRS
6. HAVE YOU EVER DONE ANYTHING, STARTED TO DO ANYTHING, OR PREPARED TO DO ANYTHING TO END YOUR LIFE?: NO
2. HAVE YOU ACTUALLY HAD ANY THOUGHTS OF KILLING YOURSELF IN THE PAST MONTH?: NO
1. IN THE PAST MONTH, HAVE YOU WISHED YOU WERE DEAD OR WISHED YOU COULD GO TO SLEEP AND NOT WAKE UP?: NO

## 2025-01-25 NOTE — ED PROVIDER NOTES
"  Emergency Department Note      History of Present Illness     Chief Complaint   Dental Pain      HPI   Disha Watson is a 39 year old female with a history of hyperlipidemia presenting with dental pain. The patient reports having pain for the past 5 days to a broken tooth in her upper maxilla. She comes into the ED for pain management.      Independent Historian   None    Review of External Notes       Past Medical History     Medical History and Problem List   Past Medical History:   Diagnosis Date    Bipolar 2 disorder     Mixed hyperlipidemia        Medications   acetaminophen (TYLENOL) 325 MG tablet  amoxicillin-clavulanate (AUGMENTIN) 875-125 MG tablet  ibuprofen (ADVIL/MOTRIN) 200 MG tablet  lisinopril (ZESTRIL) 10 MG tablet  omeprazole (PRILOSEC OTC) 20 MG EC tablet  ondansetron (ZOFRAN-ODT) 4 MG ODT tab  oxyCODONE (ROXICODONE) 5 MG tablet  Sennosides (SENNA) 8.6 MG CAPS        Surgical History   Past Surgical History:   Procedure Laterality Date    BARIATRIC SURGERY      LAPAROSCOPIC CHOLECYSTECTOMY      Primary Low Transverse  Section  2001    REPAIR HERNIA WITH MESH         Physical Exam     Patient Vitals for the past 24 hrs:   BP Temp Temp src Pulse Resp SpO2 Height Weight   25 0008 (!) 151/101 97  F (36.1  C) Temporal 71 20 97 % 1.6 m (5' 3\") 89.5 kg (197 lb 5 oz)     Physical Exam  Vitals reviewed.   HENT:      Head:      Comments: Mild facial swelling of the right buccal gingival fold.  No fluctuance.  Very decayed teeth.  Tender over upper right maxilla and right molar.  Cardiovascular:      Rate and Rhythm: Normal rate.   Pulmonary:      Effort: Pulmonary effort is normal.   Neurological:      General: No focal deficit present.      Mental Status: She is alert.           Diagnostics     Lab Results   Labs Ordered and Resulted from Time of ED Arrival to Time of ED Departure - No data to display    Imaging   No orders to display     Independent Interpretation   None    ED " Course      Medications Administered   Medications   BUPivacaine 0.5 % EPINEPHrine 1:200,000 dental injection SOLN 1.8 mL (has no administration in time range)       Procedures   Procedures     Dental Block     Procedure: Dental Block  Indication:  Dental pain  Consent: Verbal  Location: #3: R upper first molar   Procedure Detail: 1.8 cc of bupivacaine 0.5% with epinephrine was injected via Supraperiosteal block:  Good visualization and identification of landmarks is achieved. Anesthetic is injected using a 27g needle into the muccobuccal fold superjacent to the affected tooth, resulting in immediate pain relief of the affected tooth and minimal bleeding.   Patient Status: The patient tolerated the procedure well: Yes. There were no complications.     Discussion of Management   None    ED Course   ED Course as of 01/25/25 0041   Sat Jan 25, 2025   0024 I obtained the history and examined the patient as noted above.     0041 I rechecked the patient and explained findings.        Additional Documentation  None    Medical Decision Making / Diagnosis     CMS Diagnoses: None    MIPS       None    MDM   Disha Watson is a 39 year old female who presents with right facial pain.  Symptoms are suspicious for facial pain secondary to odontogenic cellulitis.  Did not recommend imaging.  Patient was offered pain control with a local injection.  Is encouraged to follow-up with dentistry for definitive care.  Discharged home in stable condition.  No concern for gingival abscess osteomyelitis or other emergency    Disposition   The patient was discharged.     Diagnosis     ICD-10-CM    1. Facial pain  R51.9            Discharge Medications   Discharge Medication List as of 1/25/2025 12:41 AM        START taking these medications    Details   benzocaine (ANBESOL) 10 % gel Take by mouth 4 times daily as needed for mouth sores.Disp-9 g, R-0Local Print      clindamycin (CLEOCIN) 300 MG capsule Take 1 capsule (300 mg) by mouth 3  times daily for 10 days., Disp-30 capsule, R-0, Local Print      HYDROcodone-acetaminophen (NORCO) 5-325 MG tablet Take 1 tablet by mouth every 6 hours as needed for pain., Disp-10 tablet, R-0, Local Print      !! ibuprofen (ADVIL/MOTRIN) 600 MG tablet Take 1 tablet (600 mg) by mouth every 6 hours as needed for moderate pain., Disp-30 tablet, R-1, Local Print       !! - Potential duplicate medications found. Please discuss with provider.          Scribe Disclosure:  I, Christy Xavier, am serving as a scribe at 12:40 AM on 1/25/2025 to document services personally performed by Eric Corado MD based on my observations and the provider's statements to me.        Eric Corado MD  01/27/25 2058

## 2025-01-25 NOTE — DISCHARGE INSTRUCTIONS
The emergency room cannot drill fill or pull teeth.  We suspect the pain in your face is related to an infection due to an open decayed tooth in the right upper maxilla.  Use antibiotics use pain medication when needed.  Please follow-up with dentistry as the emergency room cannot assist you for definitive care for your tooth.

## 2025-01-25 NOTE — Clinical Note
Disha Watson was seen and treated in our emergency department on 1/25/2025.  She may return to work on 01/27/2025.       If you have any questions or concerns, please don't hesitate to call.      Eric Corado MD

## 2025-06-21 ENCOUNTER — HEALTH MAINTENANCE LETTER (OUTPATIENT)
Age: 40
End: 2025-06-21